# Patient Record
Sex: FEMALE | Race: WHITE | NOT HISPANIC OR LATINO | Employment: FULL TIME | URBAN - METROPOLITAN AREA
[De-identification: names, ages, dates, MRNs, and addresses within clinical notes are randomized per-mention and may not be internally consistent; named-entity substitution may affect disease eponyms.]

---

## 2024-05-03 ENCOUNTER — TELEPHONE (OUTPATIENT)
Dept: URGENT CARE | Facility: CLINIC | Age: 54
End: 2024-05-03

## 2024-05-03 ENCOUNTER — OFFICE VISIT (OUTPATIENT)
Dept: URGENT CARE | Facility: CLINIC | Age: 54
End: 2024-05-03
Payer: COMMERCIAL

## 2024-05-03 ENCOUNTER — PATIENT MESSAGE (OUTPATIENT)
Dept: URGENT CARE | Facility: CLINIC | Age: 54
End: 2024-05-03
Payer: COMMERCIAL

## 2024-05-03 VITALS
RESPIRATION RATE: 19 BRPM | SYSTOLIC BLOOD PRESSURE: 137 MMHG | OXYGEN SATURATION: 97 % | HEIGHT: 61 IN | WEIGHT: 190 LBS | BODY MASS INDEX: 35.87 KG/M2 | HEART RATE: 110 BPM | DIASTOLIC BLOOD PRESSURE: 82 MMHG | TEMPERATURE: 100 F

## 2024-05-03 DIAGNOSIS — J18.9 PNEUMONIA OF RIGHT UPPER LOBE DUE TO INFECTIOUS ORGANISM: Primary | ICD-10-CM

## 2024-05-03 DIAGNOSIS — R06.2 WHEEZING: ICD-10-CM

## 2024-05-03 DIAGNOSIS — R50.9 FEVER, UNSPECIFIED FEVER CAUSE: ICD-10-CM

## 2024-05-03 DIAGNOSIS — R05.9 COUGH, UNSPECIFIED TYPE: ICD-10-CM

## 2024-05-03 LAB
CTP QC/QA: YES
CTP QC/QA: YES
POC MOLECULAR INFLUENZA A AGN: NEGATIVE
POC MOLECULAR INFLUENZA B AGN: NEGATIVE
SARS-COV-2 AG RESP QL IA.RAPID: NEGATIVE

## 2024-05-03 PROCEDURE — 94640 AIRWAY INHALATION TREATMENT: CPT | Mod: S$GLB,,, | Performed by: FAMILY MEDICINE

## 2024-05-03 PROCEDURE — 99203 OFFICE O/P NEW LOW 30 MIN: CPT | Mod: 25,S$GLB,, | Performed by: NURSE PRACTITIONER

## 2024-05-03 PROCEDURE — 71046 X-RAY EXAM CHEST 2 VIEWS: CPT | Mod: S$GLB,,, | Performed by: RADIOLOGY

## 2024-05-03 PROCEDURE — 87502 INFLUENZA DNA AMP PROBE: CPT | Mod: QW,S$GLB,, | Performed by: NURSE PRACTITIONER

## 2024-05-03 PROCEDURE — 87811 SARS-COV-2 COVID19 W/OPTIC: CPT | Mod: QW,S$GLB,, | Performed by: NURSE PRACTITIONER

## 2024-05-03 RX ORDER — BENZONATATE 200 MG/1
200 CAPSULE ORAL 3 TIMES DAILY PRN
Qty: 30 CAPSULE | Refills: 0 | Status: SHIPPED | OUTPATIENT
Start: 2024-05-03 | End: 2024-05-13

## 2024-05-03 RX ORDER — AZITHROMYCIN 250 MG/1
TABLET, FILM COATED ORAL
Qty: 6 TABLET | Refills: 0 | Status: SHIPPED | OUTPATIENT
Start: 2024-05-03

## 2024-05-03 RX ORDER — ALBUTEROL SULFATE 90 UG/1
2 AEROSOL, METERED RESPIRATORY (INHALATION) EVERY 6 HOURS PRN
Qty: 18 G | Refills: 0 | Status: SHIPPED | OUTPATIENT
Start: 2024-05-03 | End: 2025-05-03

## 2024-05-03 RX ORDER — ALBUTEROL SULFATE 0.83 MG/ML
2.5 SOLUTION RESPIRATORY (INHALATION)
Status: COMPLETED | OUTPATIENT
Start: 2024-05-03 | End: 2024-05-03

## 2024-05-03 RX ORDER — AMOXICILLIN AND CLAVULANATE POTASSIUM 875; 125 MG/1; MG/1
1 TABLET, FILM COATED ORAL 2 TIMES DAILY
Qty: 20 TABLET | Refills: 0 | Status: SHIPPED | OUTPATIENT
Start: 2024-05-03 | End: 2024-05-13

## 2024-05-03 RX ORDER — GABAPENTIN 300 MG/1
300 CAPSULE ORAL 3 TIMES DAILY
COMMUNITY
Start: 2024-04-19

## 2024-05-03 RX ORDER — ZOLPIDEM TARTRATE 10 MG/1
10 TABLET ORAL NIGHTLY PRN
COMMUNITY
Start: 2024-04-23

## 2024-05-03 RX ADMIN — ALBUTEROL SULFATE 2.5 MG: 0.83 SOLUTION RESPIRATORY (INHALATION) at 09:05

## 2024-05-03 NOTE — TELEPHONE ENCOUNTER
X-Ray Chest PA And Lateral    Result Date: 5/3/2024  EXAMINATION: XR CHEST PA AND LATERAL CLINICAL HISTORY: Cough, unspecified TECHNIQUE: PA and lateral views of the chest were performed. COMPARISON: None. FINDINGS: The heart size and the mediastinal contour are normal.  Lungs are expanded.  The posterior segment of the right upper lobe has a few cm region of consolidation.  With history of cough and fever this could represent pneumonia.  Aspiration and asymmetric edema are other possibilities.  Small amount of consolidation is present at right lung base laterally also.  Left lung looks clear.  No significant pleural fluid is seen.  Skeletal structures are intact.     Right upper lobe consolidation, possible pneumonia.  Clinical correlation and follow-up recommended. This report was flagged in Epic as abnormal. Electronically signed by: Jonathan Rangel MD Date:    05/03/2024 Time:    10:02      Left message for callback

## 2024-05-03 NOTE — LETTER
[address] [facility] [address]  Phone: [phone]  Fax: [phone]          Return to Work/School    Patient: Anna Suarez  YOB: 1970   Date: 05/03/2024     To Whom It May Concern:     Anna Suarez was in contact with/seen in my office on 05/03/2024. COVID-19 is present in our communities across the state. There is limited testing for COVID at this time, so not all patients can be tested. In this situation, your employee meets the following criteria:     Anna Suarez has met the criteria for COVID-19 testing and has a NEGATIVE result. The employee can return to work once they are asymptomatic for 24 hours without the use of fever reducing medications (Tylenol, Motrin, etc).     If you have any questions or concerns, or if I can be of further assistance, please do not hesitate to contact me.     Sincerely,    Brianna Cordoba NP

## 2024-05-03 NOTE — PROGRESS NOTES
"Subjective:      Patient ID: Anna Suarez is a 53 y.o. female.    Vitals:  height is 5' 1" (1.549 m) and weight is 86.2 kg (190 lb). Her oral temperature is 99.8 °F (37.7 °C). Her blood pressure is 137/82 and her pulse is 110. Her respiration is 19 and oxygen saturation is 97%.     Chief Complaint: Cough (A [facility] visit yesterday recommended that I come to an urgent care to check for bronchitis or maybe pneumonia.  They recommended oxygen levels be checked & maybe a chest xray.  Coughing, shortness of breath, and fever at times. - Entered by patient)    Patient presents with cough, rattling in chest, fever, head congestion, wheezing,shortness of breath. Onset 3 days. Otc meds  Provider note begins below    Patient reports sinus congestion started a week ago.  She started having headaches 6 days ago.  Fevers that are mostly at night started 2 days ago.  Her fevers have been over 101.  She has a cough with shortness of breath when walking.  No history of asthma or COPD.      Cough  This is a new problem. Episode onset: -3days. The problem has been unchanged. The problem occurs constantly. Associated symptoms include a fever, nasal congestion, postnasal drip, shortness of breath and wheezing. Pertinent negatives include no chest pain or sore throat. The symptoms are aggravated by pollens. She has tried OTC cough suppressant for the symptoms. The treatment provided no relief.       Constitution: Positive for fatigue and fever.   HENT:  Positive for postnasal drip. Negative for sore throat.    Cardiovascular:  Negative for chest pain.   Respiratory:  Positive for chest tightness, cough, shortness of breath and wheezing. Negative for asthma.    Allergic/Immunologic: Negative for asthma.      Objective:     Physical Exam   Constitutional: She is oriented to person, place, and time.   HENT:   Head: Normocephalic and atraumatic.   Ears:   Right Ear: Tympanic membrane, external ear and ear canal normal.   Left Ear: " Tympanic membrane, external ear and ear canal normal.   Nose: Rhinorrhea present. No congestion.   Mouth/Throat: No oropharyngeal exudate or posterior oropharyngeal erythema.   Cardiovascular: Regular rhythm. Tachycardia present.   Pulmonary/Chest: Effort normal and breath sounds normal. No stridor. No respiratory distress. She has no wheezes. She has no rhonchi. She has no rales. She exhibits no tenderness.   Abdominal: Normal appearance.   Neurological: She is alert and oriented to person, place, and time.   Skin: Skin is warm and dry.   Psychiatric: Her behavior is normal. Mood normal.         Results for orders placed or performed in visit on 05/03/24   SARS Coronavirus 2 Antigen, POCT Manual Read   Result Value Ref Range    SARS Coronavirus 2 Antigen Negative Negative     Acceptable Yes    POCT Influenza A/B MOLECULAR   Result Value Ref Range    POC Molecular Influenza A Ag Negative Negative    POC Molecular Influenza B Ag Negative Negative     Acceptable Yes       Assessment:     1. Pneumonia of right upper lobe due to infectious organism    2. Cough, unspecified type    3. Fever, unspecified fever cause    4. Wheezing        Plan:   COVID test negative   Flu test negative  Chest x-ray possible right upper lobe pneumonia  Nebulizer treatment in clinic  Inhaler for home use   Tessalon Perles and antibiotics   Follow-up if not improving        Pneumonia of right upper lobe due to infectious organism    Cough, unspecified type  -     SARS Coronavirus 2 Antigen, POCT Manual Read  -     POCT Influenza A/B MOLECULAR  -     X-Ray Chest PA And Lateral; Future; Expected date: 05/03/2024    Fever, unspecified fever cause  -     POCT Influenza A/B MOLECULAR  -     X-Ray Chest PA And Lateral; Future; Expected date: 05/03/2024    Wheezing  -     albuterol (VENTOLIN HFA) 90 mcg/actuation inhaler; Inhale 2 puffs into the lungs every 6 (six) hours as needed for Wheezing. Rescue  Dispense: 18 g;  Refill: 0  -     albuterol nebulizer solution 2.5 mg    Other orders  -     amoxicillin-clavulanate 875-125mg (AUGMENTIN) 875-125 mg per tablet; Take 1 tablet by mouth 2 (two) times daily. for 10 days  Dispense: 20 tablet; Refill: 0  -     benzonatate (TESSALON) 200 MG capsule; Take 1 capsule (200 mg total) by mouth 3 (three) times daily as needed for Cough.  Dispense: 30 capsule; Refill: 0

## 2024-05-04 ENCOUNTER — TELEPHONE (OUTPATIENT)
Dept: URGENT CARE | Facility: CLINIC | Age: 54
End: 2024-05-04
Payer: COMMERCIAL

## 2024-05-04 NOTE — TELEPHONE ENCOUNTER
Follow-up call. Still has fever.  Has been on antibiotics for 24 hours.  Will call tomorrow to check on patient.

## 2024-05-05 ENCOUNTER — TELEPHONE (OUTPATIENT)
Dept: URGENT CARE | Facility: CLINIC | Age: 54
End: 2024-05-05
Payer: COMMERCIAL

## 2024-05-05 DIAGNOSIS — J18.9 PNEUMONIA OF RIGHT UPPER LOBE DUE TO INFECTIOUS ORGANISM: Primary | ICD-10-CM

## 2024-05-05 RX ORDER — PROMETHAZINE HYDROCHLORIDE AND DEXTROMETHORPHAN HYDROBROMIDE 6.25; 15 MG/5ML; MG/5ML
5 SYRUP ORAL NIGHTLY PRN
Qty: 120 ML | Refills: 0 | Status: SHIPPED | OUTPATIENT
Start: 2024-05-05 | End: 2024-05-15

## 2024-05-05 NOTE — TELEPHONE ENCOUNTER
Follow-up call.  Still having a fever. Decreasing fever.  Having diarrhea.  Drink plenty of electrolytes and fluids.  Avoid diary products, spicy foods, and greasy foods.  Hettinger diet.  Go to ER if not urinating or noted blood in stool.     Hettinger diet.   Follow up if not improving.

## 2024-07-01 ENCOUNTER — OFFICE VISIT (OUTPATIENT)
Dept: PAIN MEDICINE | Facility: CLINIC | Age: 54
End: 2024-07-01
Payer: COMMERCIAL

## 2024-07-01 VITALS
HEART RATE: 83 BPM | HEIGHT: 61 IN | WEIGHT: 191.81 LBS | OXYGEN SATURATION: 100 % | RESPIRATION RATE: 18 BRPM | SYSTOLIC BLOOD PRESSURE: 164 MMHG | TEMPERATURE: 98 F | DIASTOLIC BLOOD PRESSURE: 98 MMHG | BODY MASS INDEX: 36.21 KG/M2

## 2024-07-01 DIAGNOSIS — M54.12 CERVICAL RADICULOPATHY: ICD-10-CM

## 2024-07-01 DIAGNOSIS — M48.02 CERVICAL SPINAL STENOSIS: Primary | ICD-10-CM

## 2024-07-01 PROCEDURE — 3080F DIAST BP >= 90 MM HG: CPT | Mod: CPTII,S$GLB,, | Performed by: ANESTHESIOLOGY

## 2024-07-01 PROCEDURE — 3008F BODY MASS INDEX DOCD: CPT | Mod: CPTII,S$GLB,, | Performed by: ANESTHESIOLOGY

## 2024-07-01 PROCEDURE — 99204 OFFICE O/P NEW MOD 45 MIN: CPT | Mod: S$GLB,,, | Performed by: ANESTHESIOLOGY

## 2024-07-01 PROCEDURE — 99999 PR PBB SHADOW E&M-EST. PATIENT-LVL V: CPT | Mod: PBBFAC,,, | Performed by: ANESTHESIOLOGY

## 2024-07-01 PROCEDURE — 1159F MED LIST DOCD IN RCRD: CPT | Mod: CPTII,S$GLB,, | Performed by: ANESTHESIOLOGY

## 2024-07-01 PROCEDURE — 1160F RVW MEDS BY RX/DR IN RCRD: CPT | Mod: CPTII,S$GLB,, | Performed by: ANESTHESIOLOGY

## 2024-07-01 PROCEDURE — 3077F SYST BP >= 140 MM HG: CPT | Mod: CPTII,S$GLB,, | Performed by: ANESTHESIOLOGY

## 2024-07-01 NOTE — PROGRESS NOTES
Chronic Pain - New Consult    Referring Physician: Domenico, Lucretiareferral    Chief Complaint:   Chief Complaint   Patient presents with    Neck Pain    Low-back Pain     SUBJECTIVE:    Anna Suarez presents to the clinic for the evaluation right arm pain. The pain started 2 years ago following a diagnosis of spinal cord stenosis in 2022 and symptoms have been unchanged.The pain is located in the right arm and describes it as burning and numbness with some radiation of shooting pain up the right arm and is rated as 3/10. The pain is rated with a score of  3/10 on the BEST day and a score of 10/10 on the WORST day.  Symptoms interfere with daily activity, sleeping, and work. The pain is exacerbated by laying for too long.  The pain is mitigated by 300 mg Gabapentin TID, 10 mg Ambien and 50 mg Tramadol but non consistent with taking the medication. The patient reports 3 hours of uninterrupted sleep per night.        Physical Therapy/Home Exercise: yes - reported no alleviation in pain with PT.     Pain Disability Index Review:      7/1/2024     3:23 PM   Last 3 PDI Scores   Pain Disability Index (PDI) 21     Pain Medications:    Gabapentin   Tramadol      report:  Reviewed and consistent with medication use as prescribed.    Pain Procedures: C6 and C7 AVERY x 4. Last 02/05/2024 with reports of 80% pain alleviation until now.     Imaging: Outside MRI cervical spine shows cervical spinal stenosis.     History reviewed. No pertinent past medical history.  History reviewed. No pertinent surgical history.  Social History     Socioeconomic History    Marital status:    Tobacco Use    Smoking status: Never    Smokeless tobacco: Never   Substance and Sexual Activity    Alcohol use: Not Currently    Drug use: Not Currently     Current Outpatient Medications   Medication Sig    gabapentin (NEURONTIN) 300 MG capsule Take 300 mg by mouth 3 (three) times daily.    zolpidem (AMBIEN) 10 mg Tab Take 10 mg by mouth nightly as  "needed.    albuterol (VENTOLIN HFA) 90 mcg/actuation inhaler Inhale 2 puffs into the lungs every 6 (six) hours as needed for Wheezing. Rescue    azithromycin (ZITHROMAX Z-DEREK) 250 MG tablet Take 2 tablets (500 mg) on  Day 1,  followed by 1 tablet (250 mg) once daily on Days 2 through 5.     No current facility-administered medications for this visit.     REVIEW OF SYSTEMS:    GENERAL:  No weight loss, malaise or fevers.  HEENT:  Negative for frequent or significant headaches.  NECK:  Negative for lumps, goiter, pain and significant neck swelling.  RESPIRATORY:  Negative for cough, wheezing or shortness of breath.  CARDIOVASCULAR:  Negative for chest pain, leg swelling or palpitations.  GI:  Negative for abdominal discomfort, blood in stools or black stools or change in bowel habits.  MUSCULOSKELETAL:  See HPI.  SKIN:  Negative for lesions, rash, and itching.  PSYCH:  Negative for sleep disturbance, mood disorder and recent psychosocial stressors.  HEMATOLOGY/LYMPHOLOGY:  Negative for prolonged bleeding, bruising easily or swollen nodes.  NEURO:   No history of headaches, syncope, paralysis, seizures or tremors.  All other reviewed and negative other than HPI.    OBJECTIVE:    BP (!) 164/98   Pulse 83   Temp 98 °F (36.7 °C)   Resp 18   Ht 5' 1" (1.549 m)   Wt 87 kg (191 lb 12.8 oz)   SpO2 100%   BMI 36.24 kg/m²     PHYSICAL EXAMINATION:    General appearance: Well appearing, in no acute distress, alert and oriented x3.  Psych:  Mood and affect appropriate.  Skin: Skin color, texture, turgor normal, no rashes or lesions, in both upper and lower body.  Head/face:  Normocephalic, atraumatic. No palpable lymph nodes.  Neck: No pain to palpation over the cervical paraspinous muscles. Spurling Negative. No pain with neck flexion, extension, or lateral flexion.   Cor: RRR  Pulm: CTA  GI:  Soft and non-tender.  Back: Straight leg raising in the sitting and supine positions is negative to radicular pain. No pain to " palpation over the spine or costovertebral angles. Normal range of motion without pain reproduction.  Extremities: Peripheral joint ROM is full and pain free without obvious instability or laxity in all four extremities. No deformities, edema, or skin discoloration. Good capillary refill.  Musculoskeletal: Shoulder, hip, sacroiliac and knee provocative maneuvers are negative. Bilateral upper and lower extremity strength is normal and symmetric.  No atrophy or tone abnormalities are noted.  Neuro: Bilateral upper and lower extremity coordination and muscle stretch reflexes are physiologic and symmetric.  Plantar response are downgoing. No loss of sensation is noted.  Gait: normal.    ASSESSMENT: 53 y.o. year old female with right upper extremity pain, consistent with     1. Cervical spinal stenosis  Ambulatory referral/consult to Spine Surgery      2. Cervical radiculopathy  Ambulatory referral/consult to Spine Surgery        PLAN:     - Continue with current medication   - Reach out to pain medicine clinic if cervical AVERY is needed. RTC in 3 months.  - Instructed patient to forward MRI report to the clinic.   - Neurosurgery referral for evaluation of cervical spinal stenosis.   - Counseled patient regarding the importance of activity modification and physical therapy.    The above plan and management options were discussed at length with patient. Patient is in agreement with the above and verbalized understanding. It will be communicated with the referring physician via electronic record, fax, or mail.    Erick Ocampo  07/01/2024    I spent a total of 30 minutes on the day of the visit.  This includes face to face time and non-face to face time preparing to see the patient by reviewing previous labs/imaging, obtaining and/or reviewing separately obtained history, documenting clinical information in the electronic or other health record, independently interpreting results and communicating results to the  patient/family/caregiver.    Gerald Cardenas

## 2024-07-12 ENCOUNTER — TELEPHONE (OUTPATIENT)
Dept: PAIN MEDICINE | Facility: CLINIC | Age: 54
End: 2024-07-12
Payer: COMMERCIAL

## 2024-07-12 ENCOUNTER — TELEPHONE (OUTPATIENT)
Dept: SPINE | Facility: CLINIC | Age: 54
End: 2024-07-12
Payer: COMMERCIAL

## 2024-07-12 ENCOUNTER — PATIENT MESSAGE (OUTPATIENT)
Dept: PAIN MEDICINE | Facility: CLINIC | Age: 54
End: 2024-07-12
Payer: COMMERCIAL

## 2024-07-12 DIAGNOSIS — M48.02 CERVICAL SPINAL STENOSIS: Primary | ICD-10-CM

## 2024-07-12 DIAGNOSIS — M54.12 CERVICAL RADICULOPATHY: ICD-10-CM

## 2024-07-12 NOTE — TELEPHONE ENCOUNTER
----- Message from Ruthie Dasilva sent at 7/12/2024 10:59 AM CDT -----  Regarding: medical records  Name of caller: Anna       What is the requesting detail: pt is requesting a call back to find out the dates her medical records were sent to  [facility]. Please give her a call back to further discuss.       Can the clinic reply by [facility]:       What number to call back:[phone]

## 2024-07-12 NOTE — TELEPHONE ENCOUNTER
Staff contacted pt , regards of letting pt know that she has been scheduled incorrectly with the back and spine team because  doesn't do spine surgery . Pt would need to see someone who does spine surgery . Pt is aware and is upset . Staff tried to schedule pt with the Neurosurgery team.

## 2024-07-12 NOTE — TELEPHONE ENCOUNTER
----- Message from Shelly Balderrama sent at 7/12/2024 12:18 PM CDT -----  Type: Patient Call Back    Who called:pt     What is the request in detail:pt is calling to find out why her appt was cancelled for dos 7/18/24. Please call pt     Can the clinic reply by [facility]?    Would the patient rather a call back or a response via [facility]? call    Best call back number:There are no phone numbers on file.      Additional Information:

## 2024-07-15 ENCOUNTER — TELEPHONE (OUTPATIENT)
Dept: PAIN MEDICINE | Facility: CLINIC | Age: 54
End: 2024-07-15
Payer: COMMERCIAL

## 2024-07-15 RX ORDER — TRAMADOL HYDROCHLORIDE 50 MG/1
50 TABLET ORAL EVERY 12 HOURS PRN
Qty: 30 TABLET | Refills: 0 | Status: SHIPPED | OUTPATIENT
Start: 2024-07-15 | End: 2024-07-30

## 2024-07-26 ENCOUNTER — TELEPHONE (OUTPATIENT)
Dept: NEUROSURGERY | Facility: CLINIC | Age: 54
End: 2024-07-26
Payer: COMMERCIAL

## 2024-07-26 NOTE — TELEPHONE ENCOUNTER
Asked pt to please bring MRI disc from external facility to her appt on 7/30. Pt verbalized understsnding

## 2024-07-29 ENCOUNTER — TELEPHONE (OUTPATIENT)
Dept: NEUROSURGERY | Facility: CLINIC | Age: 54
End: 2024-07-29
Payer: COMMERCIAL

## 2024-07-30 ENCOUNTER — OFFICE VISIT (OUTPATIENT)
Dept: NEUROSURGERY | Facility: CLINIC | Age: 54
End: 2024-07-30
Payer: COMMERCIAL

## 2024-07-30 VITALS
BODY MASS INDEX: 35.8 KG/M2 | DIASTOLIC BLOOD PRESSURE: 84 MMHG | SYSTOLIC BLOOD PRESSURE: 147 MMHG | WEIGHT: 189.63 LBS | HEART RATE: 79 BPM | HEIGHT: 61 IN | OXYGEN SATURATION: 96 %

## 2024-07-30 DIAGNOSIS — M54.12 CERVICAL RADICULOPATHY: ICD-10-CM

## 2024-07-30 DIAGNOSIS — M48.02 CERVICAL SPINAL STENOSIS: ICD-10-CM

## 2024-07-30 DIAGNOSIS — M48.02 SPINAL STENOSIS, CERVICAL REGION: Primary | ICD-10-CM

## 2024-07-30 PROBLEM — G47.00 INSOMNIA: Status: ACTIVE | Noted: 2024-04-23

## 2024-07-30 PROCEDURE — 99204 OFFICE O/P NEW MOD 45 MIN: CPT | Mod: S$GLB,,, | Performed by: PHYSICIAN ASSISTANT

## 2024-07-30 PROCEDURE — 3077F SYST BP >= 140 MM HG: CPT | Mod: CPTII,S$GLB,, | Performed by: PHYSICIAN ASSISTANT

## 2024-07-30 PROCEDURE — 1159F MED LIST DOCD IN RCRD: CPT | Mod: CPTII,S$GLB,, | Performed by: PHYSICIAN ASSISTANT

## 2024-07-30 PROCEDURE — 3008F BODY MASS INDEX DOCD: CPT | Mod: CPTII,S$GLB,, | Performed by: PHYSICIAN ASSISTANT

## 2024-07-30 PROCEDURE — 3079F DIAST BP 80-89 MM HG: CPT | Mod: CPTII,S$GLB,, | Performed by: PHYSICIAN ASSISTANT

## 2024-07-30 PROCEDURE — 1160F RVW MEDS BY RX/DR IN RCRD: CPT | Mod: CPTII,S$GLB,, | Performed by: PHYSICIAN ASSISTANT

## 2024-07-30 RX ORDER — MELOXICAM 15 MG/1
TABLET ORAL
COMMUNITY
End: 2024-07-31

## 2024-07-30 RX ORDER — CYCLOBENZAPRINE HCL 10 MG
TABLET ORAL
COMMUNITY
End: 2024-07-31

## 2024-07-30 NOTE — PROGRESS NOTES
[facility]  Neurosurgery    SUBJECTIVE:     History of Present Illness:  Anna Suarez is a 53 y.o. female with chronic cervical radiculopathy who presents with cervical radiculopathy. Symptoms began at least two years ago and has responded well to interventional pain mgmt procedures in the past, though with decreasing efficacy. She denies neck pain. Pain starts in her right arm and travels down to her 1st 3 fingers, but occasionally includes all 5 fingers. She notes numbness in her right hand as well. Pain can be at any time of the day or night, but frequently disrupts sleep. She can stand up at night and let her right arm hang limp to get some relief.     Denies gait disturbance and fine motor dysfunction.     Treatments tried:  -AVERY: reports 4 prior procedures, three of which sound like cervical AVERY. Most recent is described as an injection with a lateral approach (facet injection, MBB?)  -Gabapentin: 300mg TID  -Muscle relaxer: flexeril 10  -Rx pain medications: tramadol and mobic    -Spine surgery: never    Blood thinners: none    (Not in a hospital admission)      Review of patient's allergies indicates:  No Known Allergies    Past Medical History:   Diagnosis Date    Anxiety     Depression     PTSD (post-traumatic stress disorder)      Past Surgical History:   Procedure Laterality Date    CARPAL TUNNEL RELEASE Right 2001    CARPAL TUNNEL RELEASE Right 2023    CERVICAL BIOPSY  W/ LOOP ELECTRODE EXCISION N/A     CHOLECYSTECTOMY N/A 2003    e sure implant Left 2012    REMOVAL OF INTRAUTERINE DEVICE (IUD) N/A 2019    UTERINE FIBROID EMBOLIZATION N/A     2008     Family History   Problem Relation Name Age of Onset    Diabetes Mellitus Mother      Breast cancer Mother      Hypertension Father      Alzheimer's disease Father      Diabetes type II Sister       Social History     Tobacco Use    Smoking status: Never    Smokeless tobacco: Never   Substance Use Topics    Alcohol use: Not Currently      "Alcohol/week: 4.0 standard drinks of alcohol     Types: 4 Shots of liquor per week    Drug use: Not Currently        Review of Systems:  As noted in HPI    OBJECTIVE:     Vital Signs (Most Recent):  Vitals:    07/30/24 0854 07/30/24 0952   BP: (!) 187/89 (!) 147/84   Pulse: 79    SpO2: 96%    Weight: 86 kg (189 lb 9.5 oz)    Height: 5' 1" (1.549 m)    PainSc:   4    PainLoc: Neck          Physical Exam:  General: well developed, well nourished, no distress  Head: normocephalic, atraumatic  Neurologic: Alert and oriented. Thought content appropriate  GCS: Motor: 6/Verbal: 5/Eyes: 4 GCS Total: 15  Language: No aphasia  Speech: No dysarthria  Cranial nerves: face symmetric, tongue midline, CN II-XII grossly intact.   Eyes: pupils equal, round, reactive to light with accommodation, EOMI.   Pulmonary: normal respirations, not labored, no accessory muscles used    Sensory: intact to light touch throughout; decreased in right C6 and C7 dermatomes     Motor Strength: Moves all extremities spontaneously with good tone.  Full strength upper and lower extremities. No abnormal movements seen.     Strength  Deltoids Triceps Biceps Wrist Extension Wrist Flexion Hand  FA   Upper: R 5/5 5/5 5/5 5/5 5/5 5/5 5/5    L 5/5 5/5 5/5 5/5 5/5 5/5 5/5     Iliopsoas         Lower: R 5/5          L 5/5           [place]: present on left   Clonus: absent    Gait: normal      Diagnostic Results:  I have personally reviewed imaging and agree with the findings.     MRI cervical spine, March 2024: (report only)  C2-3: Moderate left foraminal stenosis   C3-4:  Mild-to-moderate left > right foraminal stenosis with mild cord flattening  C4-5: Moderate right foraminal stenosis   C5-6: right foramen is minimally narrowed  C6-7: Severe left > right foraminal stenosis    ASSESSMENT/PLAN:     Anna Suarez is a 53 y.o. female who presents with right cervical radiculopathy. 2024 imaging could not be loaded today and has been sent to our MRI department " for review. 2022 imaging reviewed directly in clinic today (screen shots at the bottom of this note). 2022 imaging reveals significant right foraminal stenosis at C4-5, C5-6, and C6-7, with C6-7 being the most severe with associated moderate to severe central stenosis.     If her most recent MRI shows progression of the discs at C5-6 and C6-7, then I would recommend surgical decompression and fusion. I would like to check a cervical CT to rule out OPLL for final surgical planning. Xrays to assess stability also ordered. FU with Dr. Pandey once updated imaging is complete and MRI has been scanned into her chart.       Please feel free to call with any further questions        Araseli Benjamin PA-C  [facility]  Department of Neurosurgery  [phone]    Disclaimer: This note was dictated by speech recognition. Minor errors in transcription may be present.  Please call with any questions.    2022:

## 2024-07-30 NOTE — Clinical Note
Please have patient FU with Katherin after CT/xrays at [facility] and MRI scanned into chart.   Thanks,  Araseli

## 2024-07-31 ENCOUNTER — OFFICE VISIT (OUTPATIENT)
Dept: PAIN MEDICINE | Facility: CLINIC | Age: 54
End: 2024-07-31
Payer: COMMERCIAL

## 2024-07-31 VITALS
DIASTOLIC BLOOD PRESSURE: 83 MMHG | OXYGEN SATURATION: 99 % | TEMPERATURE: 97 F | HEART RATE: 73 BPM | SYSTOLIC BLOOD PRESSURE: 156 MMHG | WEIGHT: 192.25 LBS | BODY MASS INDEX: 36.32 KG/M2

## 2024-07-31 DIAGNOSIS — M48.02 CERVICAL SPINAL STENOSIS: ICD-10-CM

## 2024-07-31 DIAGNOSIS — M54.12 CERVICAL RADICULOPATHY: Primary | ICD-10-CM

## 2024-07-31 DIAGNOSIS — M62.838 MUSCLE SPASMS OF NECK: ICD-10-CM

## 2024-07-31 PROCEDURE — 3079F DIAST BP 80-89 MM HG: CPT | Mod: CPTII,S$GLB,,

## 2024-07-31 PROCEDURE — 1160F RVW MEDS BY RX/DR IN RCRD: CPT | Mod: CPTII,S$GLB,,

## 2024-07-31 PROCEDURE — 1159F MED LIST DOCD IN RCRD: CPT | Mod: CPTII,S$GLB,,

## 2024-07-31 PROCEDURE — 99999 PR PBB SHADOW E&M-EST. PATIENT-LVL III: CPT | Mod: PBBFAC,,,

## 2024-07-31 PROCEDURE — 99214 OFFICE O/P EST MOD 30 MIN: CPT | Mod: S$GLB,,,

## 2024-07-31 PROCEDURE — 3008F BODY MASS INDEX DOCD: CPT | Mod: CPTII,S$GLB,,

## 2024-07-31 PROCEDURE — 3077F SYST BP >= 140 MM HG: CPT | Mod: CPTII,S$GLB,,

## 2024-07-31 RX ORDER — DEXTROMETHORPHAN HYDROBROMIDE, BUPROPION HYDROCHLORIDE 105; 45 MG/1; MG/1
1 TABLET, MULTILAYER, EXTENDED RELEASE ORAL DAILY
COMMUNITY

## 2024-07-31 RX ORDER — TRAZODONE HYDROCHLORIDE 50 MG/1
50 TABLET ORAL NIGHTLY
COMMUNITY

## 2024-07-31 RX ORDER — CYCLOBENZAPRINE HCL 10 MG
10 TABLET ORAL
Qty: 30 TABLET | Refills: 2 | Status: SHIPPED | OUTPATIENT
Start: 2024-07-31

## 2024-07-31 NOTE — PATIENT INSTRUCTIONS
Consider increasing Gabapentin as tolerated. Can increase to 1200 mg three times daily as long as no side effects. Discuss with psychiatry.    Consider Duloxetine. Can help with neuropathic pain. Discuss with psychiatry.     Add Tylenol: Can take up to 1000 mg 2-3 times per day

## 2024-07-31 NOTE — PROGRESS NOTES
Chronic Pain - New Consult    Referring Physician: No ref. provider found    Chief Complaint:   Chief Complaint   Patient presents with    Hand Pain     SUBJECTIVE:    Interval History 7/31/2024:  Anna Suarez returns to clinic for follow-up of chronic neck pain. She saw Neurosurgery yesterday for initial consult. They are considering surgical decompression and fusion. The patient is currently scheduled for cervical imaging update of X-ray and CT. She recently had a short-course of Tramadol 50 mg q12 hours PRN by Dr Cardenas. She states the medication helped to take the edge off, but she is worried about long-term opioid use. She does not wish to continue and would like recommendations or other medications to help mitigate her pain. She is currently prescribed Gabapentin 300 mg TID by her psychiatrist. She denies recent falls or trauma. She denies new onset fever/night sweats, urinary incontinence, bowel incontinence, significant weight changes, significant motor weakness or changes, or loss of sensations. Her pain today is 4/10.       Original HPI 7/1/2024:  Anna Suarez presents to the clinic for the evaluation right arm pain. The pain started 2 years ago following a diagnosis of spinal cord stenosis in 2022 and symptoms have been unchanged.The pain is located in the right arm and describes it as burning and numbness with some radiation of shooting pain up the right arm and is rated as 3/10. The pain is rated with a score of  3/10 on the BEST day and a score of 10/10 on the WORST day.  Symptoms interfere with daily activity, sleeping, and work. The pain is exacerbated by laying for too long.  The pain is mitigated by 300 mg Gabapentin TID, 10 mg Ambien and 50 mg Tramadol but non consistent with taking the medication. The patient reports 3 hours of uninterrupted sleep per night.        Physical Therapy/Home Exercise: yes - reported no alleviation in pain with PT.     Pain Disability Index Review:      7/31/2024      8:12 AM 7/1/2024     3:23 PM   Last 3 PDI Scores   Pain Disability Index (PDI) 28 21     Pain Medications:    Gabapentin   Tramadol      report:  Reviewed and consistent with medication use as prescribed.    Pain Procedures: C6 and C7 AVERY x 4. Last 02/05/2024 with reports of 80% pain alleviation until now.     Imaging: Outside MRI cervical spine shows cervical spinal stenosis.     Past Medical History:   Diagnosis Date    Anxiety     Depression     PTSD (post-traumatic stress disorder)      Past Surgical History:   Procedure Laterality Date    CARPAL TUNNEL RELEASE Right 2001    CARPAL TUNNEL RELEASE Right 2023    CERVICAL BIOPSY  W/ LOOP ELECTRODE EXCISION N/A     CHOLECYSTECTOMY N/A 2003    e sure implant Left 2012    REMOVAL OF INTRAUTERINE DEVICE (IUD) N/A 2019    UTERINE FIBROID EMBOLIZATION N/A     2008     Social History     Socioeconomic History    Marital status:    Tobacco Use    Smoking status: Never    Smokeless tobacco: Never   Substance and Sexual Activity    Alcohol use: Not Currently     Alcohol/week: 4.0 standard drinks of alcohol     Types: 4 Shots of liquor per week    Drug use: Not Currently    Sexual activity: Not Currently     Social Determinants of Health     Financial Resource Strain: Patient Declined (7/25/2024)    Overall Financial Resource Strain (CARDIA)     Difficulty of Paying Living Expenses: Patient declined   Food Insecurity: Patient Declined (7/25/2024)    Hunger Vital Sign     Worried About Running Out of Food in the Last Year: Patient declined     Ran Out of Food in the Last Year: Patient declined   Physical Activity: Unknown (7/25/2024)    Exercise Vital Sign     Days of Exercise per Week: 2 days     Minutes of Exercise per Session: Patient declined   Stress: Patient Declined (7/25/2024)    [employer] [place] [facility] [employer] - Occupational Stress Questionnaire     Feeling of Stress : Patient declined   Housing Stability: Unknown (7/25/2024)    Housing Stability  Vital Sign     Unable to Pay for Housing in the Last Year: Patient declined     Current Outpatient Medications   Medication Sig    dextromethorphan-bupropion (AUVELITY)  mg TbIE Take 1 tablet by mouth once daily.    gabapentin (NEURONTIN) 300 MG capsule Take 300 mg by mouth 3 (three) times daily.    traZODone (DESYREL) 50 MG tablet Take 50 mg by mouth every evening.    zolpidem (AMBIEN) 10 mg Tab Take 10 mg by mouth nightly as needed.    cyclobenzaprine (FLEXERIL) 10 MG tablet Take 1 tablet (10 mg total) by mouth as needed for Muscle spasms (nightly).     No current facility-administered medications for this visit.     REVIEW OF SYSTEMS:    GENERAL:  No weight loss, malaise or fevers.  HEENT:  Negative for frequent or significant headaches.  NECK:  Negative for lumps, goiter, pain and significant neck swelling.  RESPIRATORY:  Negative for cough, wheezing or shortness of breath.  CARDIOVASCULAR:  Negative for chest pain, leg swelling or palpitations.  GI:  Negative for abdominal discomfort, blood in stools or black stools or change in bowel habits.  MUSCULOSKELETAL:  See HPI.  SKIN:  Negative for lesions, rash, and itching.  PSYCH:  Negative for sleep disturbance, mood disorder and recent psychosocial stressors.  HEMATOLOGY/LYMPHOLOGY:  Negative for prolonged bleeding, bruising easily or swollen nodes.  NEURO:   No history of headaches, syncope, paralysis, seizures or tremors.  All other reviewed and negative other than HPI.    OBJECTIVE:    BP (!) 156/83   Pulse 73   Temp 97.4 °F (36.3 °C)   Wt 87.2 kg (192 lb 3.9 oz)   SpO2 99%   BMI 36.32 kg/m²     PHYSICAL EXAMINATION:     General appearance: Well appearing, in no acute distress, alert and oriented x3.  Psych:  Mood and affect appropriate.  Skin: Skin color, texture, turgor normal, no rashes or lesions, in both upper and lower body.  Head/face:  Normocephalic, atraumatic. No palpable lymph nodes.  Neck: No pain to palpation over the cervical  paraspinous muscles. Spurling Negative. No pain with neck flexion, extension, or lateral flexion.   Cor: Rate regular.   Pulm: Bilateral chest rise. In no apparent respiratory distress.   GI:  Non-distended.   Extremities: Peripheral joint ROM is full and pain free without obvious instability or laxity in all four extremities. No deformities, edema, or skin discoloration. Good capillary refill.  Musculoskeletal: Bilateral upper and lower extremity strength is normal and symmetric.  No atrophy or tone abnormalities are noted.  Neuro: Norwood's absent. No loss of sensation is noted.  Gait: normal.    ASSESSMENT: 53 y.o. year old female with right upper extremity pain, consistent with     1. Cervical radiculopathy  cyclobenzaprine (FLEXERIL) 10 MG tablet      2. Muscle spasms of neck  cyclobenzaprine (FLEXERIL) 10 MG tablet      3. Cervical spinal stenosis            PLAN:     - Continue with current medication  - She defers opioid medication management at this time.  - Consider increasing Gabapentin dosing. Maximum dosing is 1200 mg TID.  Discuss with psychiatrist.   - Refilled Cyclobenzaprine 10 mg QHS.    - Consider adding Cymbalta 20-30 mg daily to start. Discuss with psychiatrist.  - Can add Tylenol 1000 mg 2-3 times per day.   - She is currently scheduled for C-spine X-ray and CT scan for surgical planning.   - Continue to follow-up with Neurosurgery re: cervical spinal stenosis.   - Counseled patient regarding the importance of activity modification and physical therapy.  - Consider Cervical AVERY. Patient will think about this.  - RTC for follow-up on 10/1/2024    The above plan and management options were discussed at length with patient. Patient is in agreement with the above and verbalized understanding.      Marietta Portillo NP  07/31/2024      I spent a total of 33 minutes on the day of the visit.  This includes face to face time and non-face to face time preparing to see the patient (eg, review of tests),  obtaining and/or reviewing separately obtained history, documenting clinical information in the electronic or other health record, independently interpreting results and communicating results to the patient/family/caregiver, or care coordinator.

## 2024-08-07 ENCOUNTER — HOSPITAL ENCOUNTER (OUTPATIENT)
Dept: RADIOLOGY | Facility: HOSPITAL | Age: 54
Discharge: HOME | End: 2024-08-07
Attending: PHYSICIAN ASSISTANT
Payer: COMMERCIAL

## 2024-08-07 DIAGNOSIS — M48.02 SPINAL STENOSIS, CERVICAL REGION: ICD-10-CM

## 2024-08-07 PROCEDURE — 72050 X-RAY EXAM NECK SPINE 4/5VWS: CPT | Mod: TC

## 2024-08-07 PROCEDURE — 72050 X-RAY EXAM NECK SPINE 4/5VWS: CPT | Mod: 26,,, | Performed by: RADIOLOGY

## 2024-08-07 PROCEDURE — 72125 CT NECK SPINE W/O DYE: CPT | Mod: TC

## 2024-08-07 PROCEDURE — 72125 CT NECK SPINE W/O DYE: CPT | Mod: 26,,, | Performed by: RADIOLOGY

## 2024-08-08 ENCOUNTER — PATIENT MESSAGE (OUTPATIENT)
Dept: NEUROSURGERY | Facility: CLINIC | Age: 54
End: 2024-08-08
Payer: COMMERCIAL

## 2024-08-08 DIAGNOSIS — R93.89 ABNORMAL CHEST X-RAY: Primary | ICD-10-CM

## 2024-08-15 ENCOUNTER — HOSPITAL ENCOUNTER (OUTPATIENT)
Dept: RADIOLOGY | Facility: HOSPITAL | Age: 54
Discharge: HOME | End: 2024-08-15
Attending: PHYSICIAN ASSISTANT
Payer: COMMERCIAL

## 2024-08-15 DIAGNOSIS — R93.89 ABNORMAL CHEST X-RAY: ICD-10-CM

## 2024-08-15 PROCEDURE — 71250 CT THORAX DX C-: CPT | Mod: TC

## 2024-08-15 PROCEDURE — 71250 CT THORAX DX C-: CPT | Mod: 26,,, | Performed by: RADIOLOGY

## 2024-08-29 ENCOUNTER — OFFICE VISIT (OUTPATIENT)
Dept: NEUROSURGERY | Facility: CLINIC | Age: 54
End: 2024-08-29
Attending: STUDENT IN AN ORGANIZED HEALTH CARE EDUCATION/TRAINING PROGRAM
Payer: COMMERCIAL

## 2024-08-29 VITALS
DIASTOLIC BLOOD PRESSURE: 71 MMHG | RESPIRATION RATE: 20 BRPM | HEART RATE: 91 BPM | SYSTOLIC BLOOD PRESSURE: 132 MMHG | HEIGHT: 61 IN | OXYGEN SATURATION: 98 % | TEMPERATURE: 99 F | BODY MASS INDEX: 35.01 KG/M2 | WEIGHT: 185.44 LBS

## 2024-08-29 DIAGNOSIS — M54.12 CERVICAL RADICULOPATHY: Primary | ICD-10-CM

## 2024-08-29 PROCEDURE — 3078F DIAST BP <80 MM HG: CPT | Mod: CPTII,S$GLB,, | Performed by: STUDENT IN AN ORGANIZED HEALTH CARE EDUCATION/TRAINING PROGRAM

## 2024-08-29 PROCEDURE — 3008F BODY MASS INDEX DOCD: CPT | Mod: CPTII,S$GLB,, | Performed by: STUDENT IN AN ORGANIZED HEALTH CARE EDUCATION/TRAINING PROGRAM

## 2024-08-29 PROCEDURE — 1159F MED LIST DOCD IN RCRD: CPT | Mod: CPTII,S$GLB,, | Performed by: STUDENT IN AN ORGANIZED HEALTH CARE EDUCATION/TRAINING PROGRAM

## 2024-08-29 PROCEDURE — 99214 OFFICE O/P EST MOD 30 MIN: CPT | Mod: S$GLB,,, | Performed by: STUDENT IN AN ORGANIZED HEALTH CARE EDUCATION/TRAINING PROGRAM

## 2024-08-29 PROCEDURE — 3075F SYST BP GE 130 - 139MM HG: CPT | Mod: CPTII,S$GLB,, | Performed by: STUDENT IN AN ORGANIZED HEALTH CARE EDUCATION/TRAINING PROGRAM

## 2024-08-29 NOTE — PROGRESS NOTES
[facility]  Neurosurgery    SUBJECTIVE:     History of Present Illness:  Anna Suarez is a 53 y.o. female with chronic cervical radiculopathy who presents with cervical radiculopathy. Symptoms began at least two years ago and has responded well to interventional pain mgmt procedures in the past, though with decreasing efficacy. She denies neck pain. Pain starts in her right arm and travels down to her 1st 3 fingers, but occasionally includes all 5 fingers. She notes numbness in her right hand as well. Pain can be at any time of the day or night, but frequently disrupts sleep. She can stand up at night and let her right arm hang limp to get some relief.     Denies gait disturbance and fine motor dysfunction.     Interval History 8/29/24: patient returns after imaging to discuss findings and consider surgery. She continues to have the same symptoms but now has noticed that she gets an occasional pain in her palmar aspect of right hand. Her symptoms are mostly numbness of fingers 1-3 on her right hand. A few times in the day it worsens and feels like burning nerve pain to her. This tends to be worse at night and interrupts her sleep. She is on a combination of medicines to help her sleep at night. She is able to work but it bothers her at work quite a bit. Her sleep is her biggest problem due to the pain which is affecting all areas of her life. She has tried multiple injections and they are becoming less effective. PT did not help her. Of note she has had carpal tunnel surgeries on both sides, the one on the right done back in 2001. She reports she had an EMG back in 2022 in [place] that supposedly said she has some return of carpal tunnel on the right.     Treatments tried:  -AVERY: reports 4 prior procedures, three of which sound like cervical AVERY. Most recent is described as an injection with a lateral approach (facet injection, MBB?)  -Gabapentin: 300mg TID  -Muscle relaxer: flexeril 10  -Rx pain  "medications: tramadol and mobic    -Spine surgery: never    Blood thinners: none    (Not in a hospital admission)      Review of patient's allergies indicates:  No Known Allergies    Past Medical History:   Diagnosis Date    Anxiety     Depression     PTSD (post-traumatic stress disorder)      Past Surgical History:   Procedure Laterality Date    CARPAL TUNNEL RELEASE Right 2001    CARPAL TUNNEL RELEASE Right 2023    CERVICAL BIOPSY  W/ LOOP ELECTRODE EXCISION N/A     CHOLECYSTECTOMY N/A 2003    e sure implant Left 2012    REMOVAL OF INTRAUTERINE DEVICE (IUD) N/A 2019    UTERINE FIBROID EMBOLIZATION N/A     2008     Family History   Problem Relation Name Age of Onset    Diabetes Mellitus Mother      Breast cancer Mother      Hypertension Father      Alzheimer's disease Father      Diabetes type II Sister       Social History     Tobacco Use    Smoking status: Never    Smokeless tobacco: Never   Substance Use Topics    Alcohol use: Not Currently     Alcohol/week: 4.0 standard drinks of alcohol     Types: 4 Shots of liquor per week    Drug use: Not Currently        Review of Systems:  As noted in HPI    OBJECTIVE:     Vital Signs (Most Recent):  Vitals:    08/29/24 0846   BP: 132/71   Pulse: 91   Resp: 20   Temp: 99.1 °F (37.3 °C)   TempSrc: Oral   SpO2: 98%   Weight: 84.1 kg (185 lb 6.5 oz)   Height: 5' 1" (1.549 m)   PainSc:   4   PainLoc: Hand         Physical Exam:  General: well developed, well nourished, no distress  Head: normocephalic, atraumatic  Neurologic: Alert and oriented. Thought content appropriate  GCS: Motor: 6/Verbal: 5/Eyes: 4 GCS Total: 15  Language: No aphasia  Speech: No dysarthria  Cranial nerves: face symmetric, tongue midline, CN II-XII grossly intact.   Eyes: pupils equal, round, reactive to light with accommodation, EOMI.   Pulmonary: normal respirations, not labored, no accessory muscles used    Sensory: intact to light touch throughout; decreased sensation in 2nd and 3rd finger on the " right.   Positive darlene and tinnel sign    Motor Strength: Moves all extremities spontaneously with good tone.  Full strength upper and lower extremities. No abnormal movements seen.     Strength  Deltoids Triceps Biceps Wrist Extension Wrist Flexion Hand  FA   Upper: R 5/5 5/5 5/5 5/5 5/5 5/5 5/5    L 5/5 5/5 5/5 5/5 5/5 5/5 5/5     Iliopsoas         Lower: R 5/5          L 5/5           Norwood: present on left   Clonus: absent    Gait: normal      Diagnostic Results:  I have personally reviewed imaging and agree with the findings.     MRI cervical spine, March 2024:  C2-3: Moderate left foraminal stenosis   C3-4:  Mild-to-moderate left > right foraminal stenosis   C4-5: Moderate right foraminal stenosis   C5-6: right foramen is minimally narrowed  C6-7: Severe left > right foraminal stenosis      CT C spine 8/7/24 showed mild calcification of disc at C6/C7 with bony osteophytes posteriorly. Some loss of cervical lordosis higher up in neck.  She has right-sided uncovertebral hypertrophy which is most pronounced at C4-5 and C6-7    Xray flex ex C spine 8/7/24: no dynamic instability    ASSESSMENT/PLAN:     Anna Suarez is a 53 y.o. female who presents with right cervical radiculopathy; I believe she is most symptomatic from disc degeneration with foraminal stenosis at C6-7  - continue current medication regiment for pain  - her pain is consistent with cervical radiculopathy, especially given the improvement she had with injections in the past. She may have a component of carpal tunnel that is contributing as well though.  We discussed at some point she could need revision carpal tunnel release in the right  - patient is wanting to proceed with surgery, but is not quite ready to schedule yet  - we did discuss the risks benefits indications alternatives of C6-7 ACDF  - patient is going to be in touch when she is ready to put something on the schedule    Ben Pandey  Neurosurgery

## 2024-08-30 ENCOUNTER — PATIENT MESSAGE (OUTPATIENT)
Dept: NEUROSURGERY | Facility: CLINIC | Age: 54
End: 2024-08-30
Payer: COMMERCIAL

## 2024-09-27 ENCOUNTER — OFFICE VISIT (OUTPATIENT)
Dept: URGENT CARE | Facility: CLINIC | Age: 54
End: 2024-09-27
Payer: COMMERCIAL

## 2024-09-27 VITALS
OXYGEN SATURATION: 99 % | BODY MASS INDEX: 34.93 KG/M2 | DIASTOLIC BLOOD PRESSURE: 93 MMHG | SYSTOLIC BLOOD PRESSURE: 155 MMHG | RESPIRATION RATE: 20 BRPM | HEART RATE: 92 BPM | WEIGHT: 185 LBS | HEIGHT: 61 IN | TEMPERATURE: 98 F

## 2024-09-27 DIAGNOSIS — M54.6 ACUTE LEFT-SIDED THORACIC BACK PAIN: ICD-10-CM

## 2024-09-27 DIAGNOSIS — S29.9XXA RIB INJURY: Primary | ICD-10-CM

## 2024-09-27 DIAGNOSIS — S20.222A: ICD-10-CM

## 2024-09-27 DIAGNOSIS — R07.81 RIB PAIN ON LEFT SIDE: ICD-10-CM

## 2024-09-27 PROBLEM — M47.812 CERVICAL SPONDYLOSIS WITHOUT MYELOPATHY: Status: ACTIVE | Noted: 2024-01-21

## 2024-09-27 PROCEDURE — 71100 X-RAY EXAM RIBS UNI 2 VIEWS: CPT | Mod: LT,S$GLB,, | Performed by: RADIOLOGY

## 2024-09-27 RX ORDER — AMOXICILLIN AND CLAVULANATE POTASSIUM 875; 125 MG/1; MG/1
TABLET, FILM COATED ORAL
COMMUNITY
End: 2024-09-27 | Stop reason: ALTCHOICE

## 2024-09-27 RX ORDER — TRAMADOL HYDROCHLORIDE 50 MG/1
TABLET ORAL
COMMUNITY

## 2024-09-27 RX ORDER — PROMETHAZINE HYDROCHLORIDE AND DEXTROMETHORPHAN HYDROBROMIDE 6.25; 15 MG/5ML; MG/5ML
SYRUP ORAL
COMMUNITY
End: 2024-09-27 | Stop reason: ALTCHOICE

## 2024-09-27 RX ORDER — BENZONATATE 200 MG/1
CAPSULE ORAL
COMMUNITY
End: 2024-09-27 | Stop reason: ALTCHOICE

## 2024-09-27 NOTE — PROGRESS NOTES
"Subjective:      Patient ID: Anna Suarez is a 54 y.o. female.    Vitals:  height is 5' 1" (1.549 m) and weight is 83.9 kg (185 lb). Her temperature is 98.4 °F (36.9 °C). Her blood pressure is 155/93 (abnormal) and her pulse is 92. Her respiration is 20 and oxygen saturation is 99%.     Chief Complaint: Injury (Fell & hit on back left side side bruising on ribs. Pain when coughed but was improving. Coughed around 2am 9/27, felt a pop, and pain increased when cough. Not sure if bruised rib or something like that. - Entered by patient)    Pt presents with complaint of left sided rib pain.  Pt states she tripped on some stairs and states she fell on her left side about 1 week ago.  Pt states her pain had bene improving until this morning when she coughed and states she heard a pop.  Pt states she started to experience the same pain form her incident.  Pt states no pain when deep breathing and denies SOB.    54-year-old female presents to clinic with reports of coughing around 2 am today and felt a popping sensation to her left ribs. Reports fall approximately one week ago injuring the same left side. History of cervical radiculopathy, right carpel tunnel release treated by neurosurgery      Injury  This is a new problem. The current episode started in the past 7 days. The problem occurs intermittently. The problem has been gradually worsening. Associated symptoms include myalgias. The symptoms are aggravated by twisting, bending and coughing. She has tried nothing for the symptoms. The treatment provided no relief.       Musculoskeletal:  Positive for pain, trauma, back pain and muscle ache.   Skin:  Positive for bruising.      Objective:     Physical Exam   Constitutional: She is oriented to person, place, and time. She appears well-developed. She is cooperative. No distress.   HENT:   Head: Normocephalic and atraumatic.   Nose: Nose normal.   Mouth/Throat: Oropharynx is clear and moist and mucous membranes are " normal.   Eyes: Lids are normal. Extraocular movement intact   Neck: Trachea normal and phonation normal. Neck supple.   Cardiovascular: Normal rate.   Pulmonary/Chest: Effort normal.   Abdominal: Normal appearance. She exhibits no mass.   Musculoskeletal:         General: No deformity.      Thoracic back: She exhibits tenderness. She exhibits no spasm.        Back:    Neurological: She is alert and oriented to person, place, and time. She has normal strength and normal reflexes. No sensory deficit.   Skin: Skin is warm, dry, intact and not diaphoretic.   Psychiatric: Her speech is normal and behavior is normal. Judgment and thought content normal.   Nursing note and vitals reviewed.      Assessment:     1. Rib injury    2. Rib pain on left side    3. Acute left-sided thoracic back pain    4. Superficial bruising of back, left, initial encounter        Plan:       Rib injury  -     X-Ray Ribs 2 View Left; Future; Expected date: 09/27/2024    Rib pain on left side  -     X-Ray Ribs 2 View Left; Future; Expected date: 09/27/2024  -     BANDAGE ELASTIC 6IN ACE    Acute left-sided thoracic back pain  -     X-Ray Ribs 2 View Left; Future; Expected date: 09/27/2024  -     BANDAGE ELASTIC 6IN ACE    Superficial bruising of back, left, initial encounter        X-Ray Ribs 2 View Left    Result Date: 9/27/2024  EXAMINATION: XR RIBS 2 VIEW LEFT CLINICAL HISTORY: Unspecified injury of thorax, initial encounter TECHNIQUE: Two views of the left ribs were performed. COMPARISON: None. FINDINGS: Heart size normal.  The lungs are clear.  No pleural effusion. No definite acute rib fractures identified     See above Electronically signed by: José Contreras MD Date:    09/27/2024 Time:    10:39      Reviewed xray with patient who acknowledged results. Discussed  Diagnosis and treatment plan with patient who verbalized understanding and agrees with plan of care. Patient given educational handouts supporting this diagnosis as well.  Ace  wrap applied, patient tolerated well. Less chest discomfort after application with deep inspiratory effort, movement and cough. Patient denies any further questions or concerns at this time.  Patient exits exam room in no acute distress.   Patient Instructions   Take 10 to 15 slow deep breaths at least 4 times each day. This will lower your chances of getting a lung infection. Use your incentive spirometer as you were told if you were given one. An incentive spirometer is a tool that measures how deeply you can breathe in.  Hold a pillow to your chest when you take deep breaths, sneeze, cough, or laugh. This will help ease the pain in your ribs.  It may hurt less to sleep in a reclined position. You can also sleep with your head and shoulders propped up on pillows.  You may want to take medicines like ibuprofen or naproxen for swelling and pain. These are nonsteroidal anti-inflammatory drugs (NSAIDS).  Ice may help you ease pain and swelling.  Place an ice pack or a bag of frozen vegetables wrapped in a towel over the painful part. Never put ice right on the skin. Do not leave the ice on more than 10 to 15 minutes at a time. Use for the first 24 to 48 hours after an injury.  Please return here or go to the Emergency Department for any concerns or worsening of condition.  Please follow up with your primary care doctor or specialist as needed.    If you  smoke, please stop smoking.

## 2024-09-27 NOTE — PATIENT INSTRUCTIONS
Take 10 to 15 slow deep breaths at least 4 times each day. This will lower your chances of getting a lung infection. Use your incentive spirometer as you were told if you were given one. An incentive spirometer is a tool that measures how deeply you can breathe in.  Hold a pillow to your chest when you take deep breaths, sneeze, cough, or laugh. This will help ease the pain in your ribs.  It may hurt less to sleep in a reclined position. You can also sleep with your head and shoulders propped up on pillows.  You may want to take medicines like ibuprofen or naproxen for swelling and pain. These are nonsteroidal anti-inflammatory drugs (NSAIDS).  Ice may help you ease pain and swelling.  Place an ice pack or a bag of frozen vegetables wrapped in a towel over the painful part. Never put ice right on the skin. Do not leave the ice on more than 10 to 15 minutes at a time. Use for the first 24 to 48 hours after an injury.  Please return here or go to the Emergency Department for any concerns or worsening of condition.  Please follow up with your primary care doctor or specialist as needed.    If you  smoke, please stop smoking.

## 2024-10-10 ENCOUNTER — PATIENT MESSAGE (OUTPATIENT)
Dept: NEUROSURGERY | Facility: CLINIC | Age: 54
End: 2024-10-10
Payer: COMMERCIAL

## 2024-10-14 DIAGNOSIS — M54.12 CERVICAL RADICULOPATHY: Primary | ICD-10-CM

## 2024-10-21 ENCOUNTER — ANESTHESIA EVENT (OUTPATIENT)
Dept: SURGERY | Facility: HOSPITAL | Age: 54
End: 2024-10-21
Payer: COMMERCIAL

## 2024-10-24 ENCOUNTER — LAB VISIT (OUTPATIENT)
Dept: LAB | Facility: HOSPITAL | Age: 54
End: 2024-10-24
Attending: HOSPITALIST
Payer: COMMERCIAL

## 2024-10-24 ENCOUNTER — OFFICE VISIT (OUTPATIENT)
Dept: INTERNAL MEDICINE | Facility: CLINIC | Age: 54
End: 2024-10-24
Payer: COMMERCIAL

## 2024-10-24 ENCOUNTER — TELEPHONE (OUTPATIENT)
Dept: PREADMISSION TESTING | Facility: HOSPITAL | Age: 54
End: 2024-10-24
Payer: COMMERCIAL

## 2024-10-24 VITALS
HEART RATE: 76 BPM | DIASTOLIC BLOOD PRESSURE: 63 MMHG | HEIGHT: 61 IN | SYSTOLIC BLOOD PRESSURE: 131 MMHG | WEIGHT: 184 LBS | TEMPERATURE: 98 F | OXYGEN SATURATION: 95 % | BODY MASS INDEX: 34.74 KG/M2 | RESPIRATION RATE: 16 BRPM

## 2024-10-24 DIAGNOSIS — Z98.890 PONV (POSTOPERATIVE NAUSEA AND VOMITING): ICD-10-CM

## 2024-10-24 DIAGNOSIS — M35.1 MCTD (MIXED CONNECTIVE TISSUE DISEASE): ICD-10-CM

## 2024-10-24 DIAGNOSIS — R06.83 SNORING: ICD-10-CM

## 2024-10-24 DIAGNOSIS — Z01.818 PREOP EXAMINATION: ICD-10-CM

## 2024-10-24 DIAGNOSIS — M54.12 CERVICAL RADICULOPATHY: ICD-10-CM

## 2024-10-24 DIAGNOSIS — Z80.3 FAMILY HISTORY OF BREAST CANCER: ICD-10-CM

## 2024-10-24 DIAGNOSIS — F43.10 PTSD (POST-TRAUMATIC STRESS DISORDER): ICD-10-CM

## 2024-10-24 DIAGNOSIS — K21.9 GASTROESOPHAGEAL REFLUX DISEASE, UNSPECIFIED WHETHER ESOPHAGITIS PRESENT: ICD-10-CM

## 2024-10-24 DIAGNOSIS — K59.00 CONSTIPATION, UNSPECIFIED CONSTIPATION TYPE: ICD-10-CM

## 2024-10-24 DIAGNOSIS — R11.2 PONV (POSTOPERATIVE NAUSEA AND VOMITING): ICD-10-CM

## 2024-10-24 DIAGNOSIS — Z86.718 HISTORY OF THROMBOSIS: ICD-10-CM

## 2024-10-24 DIAGNOSIS — G47.00 INSOMNIA, UNSPECIFIED TYPE: ICD-10-CM

## 2024-10-24 DIAGNOSIS — Z97.5 IUD (INTRAUTERINE DEVICE) IN PLACE: ICD-10-CM

## 2024-10-24 DIAGNOSIS — Z01.818 PREOP EXAMINATION: Primary | ICD-10-CM

## 2024-10-24 DIAGNOSIS — Z87.01 HISTORY OF PNEUMONIA: ICD-10-CM

## 2024-10-24 LAB
ALBUMIN SERPL BCP-MCNC: 3.9 G/DL (ref 3.5–5.2)
ALP SERPL-CCNC: 62 U/L (ref 40–150)
ALT SERPL W/O P-5'-P-CCNC: 12 U/L (ref 10–44)
ANION GAP SERPL CALC-SCNC: 8 MMOL/L (ref 8–16)
AST SERPL-CCNC: 17 U/L (ref 10–40)
BASOPHILS # BLD AUTO: 0.02 K/UL (ref 0–0.2)
BASOPHILS NFR BLD: 0.3 % (ref 0–1.9)
BILIRUB SERPL-MCNC: 0.3 MG/DL (ref 0.1–1)
BUN SERPL-MCNC: 19 MG/DL (ref 6–20)
CALCIUM SERPL-MCNC: 9.3 MG/DL (ref 8.7–10.5)
CHLORIDE SERPL-SCNC: 104 MMOL/L (ref 95–110)
CO2 SERPL-SCNC: 27 MMOL/L (ref 23–29)
CREAT SERPL-MCNC: 0.8 MG/DL (ref 0.5–1.4)
DIFFERENTIAL METHOD BLD: ABNORMAL
EOSINOPHIL # BLD AUTO: 0.2 K/UL (ref 0–0.5)
EOSINOPHIL NFR BLD: 2.8 % (ref 0–8)
ERYTHROCYTE [DISTWIDTH] IN BLOOD BY AUTOMATED COUNT: 13.8 % (ref 11.5–14.5)
EST. GFR  (NO RACE VARIABLE): >60 ML/MIN/1.73 M^2
ESTIMATED AVG GLUCOSE: 108 MG/DL (ref 68–131)
GLUCOSE SERPL-MCNC: 93 MG/DL (ref 70–110)
HBA1C MFR BLD: 5.4 % (ref 4–5.6)
HCT VFR BLD AUTO: 42.9 % (ref 37–48.5)
HGB BLD-MCNC: 13.5 G/DL (ref 12–16)
IMM GRANULOCYTES # BLD AUTO: 0.02 K/UL (ref 0–0.04)
IMM GRANULOCYTES NFR BLD AUTO: 0.3 % (ref 0–0.5)
INR PPP: 1 (ref 0.8–1.2)
LYMPHOCYTES # BLD AUTO: 2.1 K/UL (ref 1–4.8)
LYMPHOCYTES NFR BLD: 28 % (ref 18–48)
MCH RBC QN AUTO: 29.3 PG (ref 27–31)
MCHC RBC AUTO-ENTMCNC: 31.5 G/DL (ref 32–36)
MCV RBC AUTO: 93 FL (ref 82–98)
MONOCYTES # BLD AUTO: 0.7 K/UL (ref 0.3–1)
MONOCYTES NFR BLD: 9 % (ref 4–15)
NEUTROPHILS # BLD AUTO: 4.5 K/UL (ref 1.8–7.7)
NEUTROPHILS NFR BLD: 59.6 % (ref 38–73)
NRBC BLD-RTO: 0 /100 WBC
PLATELET # BLD AUTO: 261 K/UL (ref 150–450)
PMV BLD AUTO: 10.3 FL (ref 9.2–12.9)
POTASSIUM SERPL-SCNC: 4.2 MMOL/L (ref 3.5–5.1)
PROT SERPL-MCNC: 7.6 G/DL (ref 6–8.4)
PROTHROMBIN TIME: 10.9 SEC (ref 9–12.5)
RBC # BLD AUTO: 4.61 M/UL (ref 4–5.4)
SODIUM SERPL-SCNC: 139 MMOL/L (ref 136–145)
WBC # BLD AUTO: 7.46 K/UL (ref 3.9–12.7)

## 2024-10-24 PROCEDURE — 85025 COMPLETE CBC W/AUTO DIFF WBC: CPT | Performed by: HOSPITALIST

## 2024-10-24 PROCEDURE — 83036 HEMOGLOBIN GLYCOSYLATED A1C: CPT | Performed by: HOSPITALIST

## 2024-10-24 PROCEDURE — 3075F SYST BP GE 130 - 139MM HG: CPT | Mod: CPTII,S$GLB,, | Performed by: HOSPITALIST

## 2024-10-24 PROCEDURE — 99999 PR PBB SHADOW E&M-EST. PATIENT-LVL V: CPT | Mod: PBBFAC,,, | Performed by: HOSPITALIST

## 2024-10-24 PROCEDURE — 1160F RVW MEDS BY RX/DR IN RCRD: CPT | Mod: CPTII,S$GLB,, | Performed by: HOSPITALIST

## 2024-10-24 PROCEDURE — 80053 COMPREHEN METABOLIC PANEL: CPT | Performed by: HOSPITALIST

## 2024-10-24 PROCEDURE — 1159F MED LIST DOCD IN RCRD: CPT | Mod: CPTII,S$GLB,, | Performed by: HOSPITALIST

## 2024-10-24 PROCEDURE — 99205 OFFICE O/P NEW HI 60 MIN: CPT | Mod: S$GLB,,, | Performed by: HOSPITALIST

## 2024-10-24 PROCEDURE — 3078F DIAST BP <80 MM HG: CPT | Mod: CPTII,S$GLB,, | Performed by: HOSPITALIST

## 2024-10-24 PROCEDURE — 85610 PROTHROMBIN TIME: CPT | Performed by: HOSPITALIST

## 2024-10-24 PROCEDURE — 3008F BODY MASS INDEX DOCD: CPT | Mod: CPTII,S$GLB,, | Performed by: HOSPITALIST

## 2024-10-24 RX ORDER — CLONIDINE HYDROCHLORIDE 0.1 MG/1
0.1 TABLET ORAL NIGHTLY
COMMUNITY

## 2024-10-24 RX ORDER — ACETAMINOPHEN 325 MG/1
325 TABLET ORAL
COMMUNITY

## 2024-10-24 RX ORDER — CLONIDINE 0.1 MG/24H
1 PATCH, EXTENDED RELEASE TRANSDERMAL
COMMUNITY

## 2024-10-24 RX ORDER — IBUPROFEN 200 MG
800 TABLET ORAL
COMMUNITY

## 2024-10-24 NOTE — ASSESSMENT & PLAN NOTE
BMI 34.77    I suggest monitoring the sodium as SIADH from wellbutrin  use and hypersecretion of ADH associated with surgery can reduce sodium in the perioperative period     Weight related conditions     Known to have     HTN  Acid reflux       Not troubled with / Not known to have       Type 2 Diabetes   Prediabetes   Gout   Hyperlipidemia   Sleep apnea   Fatty liver   Osteoarthritis    Encouraged maintaining healthy weight for improved health

## 2024-10-24 NOTE — ASSESSMENT & PLAN NOTE
Her mother has had breast cancer at a later age   I have discussed the importance of mammograms and breast cancer screening with self-exam and clinical exam

## 2024-10-24 NOTE — ASSESSMENT & PLAN NOTE
Possible sleep apnea- I suggest a sleep study and suggest caution with usage of medication that can cause respiratory suppression in the perioperative period  potential ramifications of untreated sleep apnea, which could include daytime sleepiness, hypertension, heart disease and stroke were discussed  Suggested not to drive, if feels sleepy    Avoidance of  supine sleep, weight gain , alcoholic beverages , care with , sedative , CNS depressant use indicated  since all of these can worsen XUAN      I have offered her sleep evaluation that she took-she wants to do this when she is done with the surgery and healing process which perhaps could be next year

## 2024-10-24 NOTE — ASSESSMENT & PLAN NOTE
PTSD clonidine dextromethorphan-bupropion trazodone come Ambien 10 mouth nightly as needed    She believes that they were multiple episodes that may have contributed to her PTSD and she is working on desensitization    Doing good from a mental health stand point   Not under psychiatry , Therapist care   Has supportive friends   On Medication that is helping   No Suicidal / Homicidal ideation      I suggested for her to reach out to her psychiatrist about perioperative use of her mental health medication

## 2024-10-24 NOTE — ASSESSMENT & PLAN NOTE
Currently not an active problem and not known to have rheumatoid arthritis, lupus, antiphospholipid antibody syndrome

## 2024-10-24 NOTE — ASSESSMENT & PLAN NOTE
Post operative nausea, vomiting - I suggest that the perioperative team be aware of this so that appropriate preventive care can be taken      She finds scopolamine patch to be helpful

## 2024-10-24 NOTE — ASSESSMENT & PLAN NOTE
She is on Ambien nightly as needed for sleeping and she also takes medication to help her sleep so that she does not experience the pain at nighttime

## 2024-10-24 NOTE — ASSESSMENT & PLAN NOTE
Mirena IUD  She started using IUD as a contraceptive  She was last sexually active with a male partner in June of 2021   She is currently not sexually active with either male or female since that time  Not pregnant to her understanding  She finds the Mirena IUD to be helpful with her mental health also    Increased risk of thrombosis discussed

## 2024-10-24 NOTE — ASSESSMENT & PLAN NOTE
She had at that time cough, phlegm and high fever  Right upper lobe consolidation, possible pneumonia   She was given antibiotic courses x3 and currently does not have any suggestions of pneumonia and has no fever cough phlegm    CT scan from August of 2024 reportedly showed  Improvement of the previously seen consolidation area in the right upper lobe with residual linear/bandlike opacity in its posterior aspect.  Scattered bilateral pulmonary micro nodules measuring 2-3 mm.  For multiple solid nodules all <6 mm, Fleischner Society 2017 guidelines recommend no routine follow up for a low risk patient, or follow up with non-contrast chest CT at 12 months after discovery in a high risk patient.    She quit tobacco about 1 year ago in October 2023    Suggested follow up    When she did smoke, she was not a heavy smoker and has had no tuberculosis contacts to her knowledge    No tuberculosis symptoms like night sweats, appetite loss or weight loss

## 2024-10-24 NOTE — ASSESSMENT & PLAN NOTE
Does not sound Cardiac   Tips to control reflux discussed     GERD-  I suggest continuation of the Proton pump inhibitor in the perioperative period . I suggest aspiration precautions    I suggest watching fatty and spicy food  Citrus and tomato based  Alcohol, caffeine, soda  Chocolate, peppermint spearmint  Not to lay down within 3 hours after eating  Not to snack before going to bed  Sleep elevated  Watching weight  Watching anti-inflammatory medication    Contributing factors for Reflux     NSAID medication

## 2024-10-24 NOTE — HPI
History of present illness- I had the pleasure of meeting this pleasant 54 y.o. lady in the pre op clinic prior to her elective spine surgery. The patient is new to me .  Offered to have family to be on the phone during the consultation      I have obtained the history by speaking to the patient and by reviewing the electronic health records.    Events leading up to surgery / History of presenting illness -    I have obtained the information by speaking to her who seems to be knowledgeable about her health    She has been troubled with pain on the right upper extremity mostly in her right hand and right forearm and gradually getting to the right upper arm  She believes that due to the arthritic changes in her neck, the nerve has been affected leading to her symptoms and she is planning on having surgery done for this on November the 18th  She has had a carpal tunnel surgery on the right hand in 2001 and if her symptoms on her right hand does not resolve, she may consider getting the carpal tunnel surgery    She has been having this discomfort about 2 years time and her pain fluctuates and can get to up to 8-9/10    Her symptoms her mostly at nighttime when she is sleeping which seems to be affecting her ability to rest and sleep and sometimes she has 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[employer]    At nighttime she switches between Tylenol/ibuprofen as she does not like taking tramadol    She also finds standing up and letting her arm hang by her side to be helpful    With pain and numbness, on occasion she has difficulty with her right hand like opening jars-she has no problems like dropping things or balance problems    She also has numbness in the similar distribution in addition to pain    With regards to the age of her having this kind of problem, she is not aware of any significant arthritis problem in her family to her knowledge    Of told she was  diagnosed with mixed connective tissue disease in 2012-and not known to have rheumatoid arthritis, lupus, antiphospholipid antibody syndrome-she was under rheumatology care but to her understanding she was not have any active rheumatological conditions        Relevant health conditions of significance for the perioperative period/ History of presenting illness -    PTSD  BMI 34.77  History of pneumonia  History of thrombosis left leg-superficial vein  Acid reflux  Constipation     Lives by herself on 2nd level apartment complex  Works full-time and the [employer] department  Pets- none  Children - none-   Pregnancies - 1  Miscarriages -2004 - 8 weeks into the gestation   C sections - none  She has   Has help post op    She is from [place] but traveled places and settled in [place] and she has help coming from [place] for a week and local help after that    Not known to have  heart problem , Stroke/ Mini stroke ,Prediabetes , Diabetes Mellitus,  Thyroid problem, Kidney problem, deep vein thrombosis, pulmonary embolism,   sleep apnea, COVID infection, fatty liver , blood vessels stent ,  edema, gout, allergies

## 2024-10-24 NOTE — ASSESSMENT & PLAN NOTE
Longstanding constipation   She has not had a colonoscopies yet  She had no colon cancer in the family    Suggested working on bowel movements in preparation for surgery   Constipation- I suggest giving bowel movement regimen as opioid use,reduced ambulation  can increase the constipation

## 2024-10-24 NOTE — PROGRESS NOTES
[facility] [address]  Progress Note    Patient Name: Anna Suarez  MRN: [id number]  Date of Evaluation- 10/24/2024  PCP- No, Primary Doctor    Future cases for Anna Suarez [[id number]]       Case ID Status Date Time Cristian Procedure Provider Location    [id number] [facility] 11/18/2024  7:15  DISCECTOMY, SPINE, CERVICAL, ANTERIOR APPROACH, WITH FUSION Ben Pandey MD [[id number]] [facility] OR            HPI:  History of present illness- I had the pleasure of meeting this pleasant 54 y.o. lady in the pre op clinic prior to her elective spine surgery. The patient is new to me .  Offered to have family to be on the phone during the consultation      I have obtained the history by speaking to the patient and by reviewing the electronic health records.    Events leading up to surgery / History of presenting illness -    I have obtained the information by speaking to her who seems to be knowledgeable about her health    She has been troubled with pain on the right upper extremity mostly in her right hand and right forearm and gradually getting to the right upper arm  She believes that due to the arthritic changes in her neck, the nerve has been affected leading to her symptoms and she is planning on having surgery done for this on November the 18th  She has had a carpal tunnel surgery on the right hand in 2001 and if her symptoms on her right hand does not resolve, she may consider getting the carpal tunnel surgery    She has been having this discomfort about 2 years time and her pain fluctuates and can get to up to 8-9/10    Her symptoms her mostly at nighttime when she is sleeping which seems to be affecting her ability to rest and sleep and sometimes she has 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[employer]    At nighttime she switches between Tylenol/ibuprofen as she does not like taking tramadol    She also finds standing up and letting her arm hang by her side to be  helpful    With pain and numbness, on occasion she has difficulty with her right hand like opening jars-she has no problems like dropping things or balance problems    She also has numbness in the similar distribution in addition to pain    With regards to the age of her having this kind of problem, she is not aware of any significant arthritis problem in her family to her knowledge    Of told she was diagnosed with mixed connective tissue disease in 2012-and not known to have rheumatoid arthritis, lupus, antiphospholipid antibody syndrome-she was under rheumatology care but to her understanding she was not have any active rheumatological conditions        Relevant health conditions of significance for the perioperative period/ History of presenting illness -    PTSD  BMI 34.77  History of pneumonia  History of thrombosis left leg-superficial vein  Acid reflux  Constipation     Lives by herself on 2nd level apartment complex  Works full-time and the [employer] department  Pets- none  Children - none-   Pregnancies - 1  Miscarriages -2004 - 8 weeks into the gestation   C sections - none  She has   Has help post op    She is from [place] but traveled places and settled in [place] and she has help coming from [place] for a week and local help after that    Not known to have  heart problem , Stroke/ Mini stroke ,Prediabetes , Diabetes Mellitus,  Thyroid problem, Kidney problem, deep vein thrombosis, pulmonary embolism,   sleep apnea, COVID infection, fatty liver , blood vessels stent ,  edema, gout, allergies                        Subjective/ Objective:     Chief complaint-Preoperative evaluation, Perioperative Medical management, complication reduction plan     Active cardiac conditions- none    Revised cardiac risk index predictors- none    Functional capacity -Examples of physical activity  -she works at a desk, she walks on the hallways at work and she takes about 20 steps to get to her apartment  "can take 1 flight of stairs----- She can undertake all the above activities without  chest pain,chest tightness, Shortness of breath ,dizziness,lightheadedness making her exercise tolerance more,   than 4 Mets.     Review of Systems   Constitutional:  Negative for chills and fever.        No unusual weight changes       HENT:          AMALIAG score  / 8     Snoring     Elevated BP    Age over 50         Eyes:         No new visual changes   Respiratory:          No cough , phlegm    No Hemoptysis   Cardiovascular:         As noted   Gastrointestinal:         No overt GI/ blood losses  Bowel movements-  constipated  She has an IUD and does not have cycles   Endocrine:        Prednisone use > 20 mg daily for 3 weeks- none   Genitourinary:  Negative for dysuria.        No urinary hesitancy    Musculoskeletal:         As above      Skin:  Negative for rash.   Neurological:  Negative for syncope.        No unilateral weakness   Hematological:         Current use of Anticoagulants  None       No past medical history pertinent negatives.        No  bleeding, cardiac problems with previous surgeries/procedures.  Medications and Allergies reviewed in epic.   FH- No anesthesia,bleeding ,  heart disease in family    Physical Exam  Blood pressure 131/63, pulse 76, temperature 97.8 °F (36.6 °C), temperature source Oral, resp. rate 16, height 5' 1" (1.549 m), weight 83.5 kg (184 lb), SpO2 95%.    I offered a sheet and the presence of a chaperone during physical examination   She was comfortable to proceed with the exam without the the presence of a chaperone          Physical Exam  Constitutional- Vitals - Body mass index is 34.77 kg/m².,   Vitals:    10/24/24 0752   BP: 131/63   Pulse: 76   Resp: 16   Temp: 97.8 °F (36.6 °C)     General appearance-Conscious,Coherent  Eyes- No conjunctival icterus,pupils  round  and reactive to light   ENT-Oral cavity- moist    , Hearing grossly normal   Neck- No thyromegaly ,Trachea -central, " No jugular venous distension,   No Carotid Bruit   Cardiovascular -Heart Sounds- Normal  and  no murmur   , No gallop rhythm   Respiratory - Normal Respiratory Effort, Normal breath sounds,  no wheeze , and  no forced expiratory wheeze    Peripheral pitting pedal edema-- none , no calf pain   Gastrointestinal -Soft abdomen, No palpable masses, Non Tender,Liver,Spleen not palpable. No-- free fluid and shifting dullness  Musculoskeletal- No finger Clubbing. Strength grossly normal   Lymphatic-No Palpable cervical, axillary,Inguinal lymphadenopathy   Psychiatric - normal effect,Orientation  Rt Dorsalis pedis pulses-palpable    Lt Dorsalis pedis pulses- palpable   Rt Posterior tibial pulses -palpable   Left posterior tibial pulses -palpable   Miscellaneous -     no asterixis,  no renal bruit, and  Tattoo  Investigations  Lab and Imaging have been reviewed in epic.      Review of old records- Was done and information gathered regards to events leading to surgery and health conditions of significance in the perioperative period.        Preoperative cardiac risk assessment-  The patient does not have any active cardiac conditions . Revised cardiac risk index predictors- ---.Functional capacity is more than 4 Mets. She will be undergoing a Spine procedure that carries a Moderate Risk risk     Risk of a major Cardiac event ( Defined as death, myocardial infarction, or cardiac arrest at 30 days after noncardiac surgery), based on RCRI score     3.9%     No further cardiac work up is indicated prior to proceeding with the surgery     Orders Placed This Encounter    CBC Auto Differential    Comprehensive Metabolic Panel    Hemoglobin A1C    Protime-INR    Ambulatory referral/consult to Sleep Disorders    Ambulatory referral/consult to Internal Medicine    Ambulatory referral/consult to Gynecology       American Society of Anesthesiologists Physical status classification ( ASA ) class: 2     Postoperative pulmonary complication  risk assessment:      ARISCAT ( [place]et) risk index- risk class -  Low, if duration of surgery is under 2 hours, intermediate, if duration of surgery is over 2 hours       Assessment/Plan:     Cervical radiculopathy  For surgery    Insomnia  She is on Ambien nightly as needed for sleeping and she also takes medication to help her sleep so that she does not experience the pain at nighttime    History of pneumonia  She had at that time cough, phlegm and high fever  Right upper lobe consolidation, possible pneumonia   She was given antibiotic courses x3 and currently does not have any suggestions of pneumonia and has no fever cough phlegm    CT scan from August of 2024 reportedly showed  Improvement of the previously seen consolidation area in the right upper lobe with residual linear/bandlike opacity in its posterior aspect.  Scattered bilateral pulmonary micro nodules measuring 2-3 mm.  For multiple solid nodules all <6 mm, Fleischner Society 2017 guidelines recommend no routine follow up for a low risk patient, or follow up with non-contrast chest CT at 12 months after discovery in a high risk patient.    She quit tobacco about 1 year ago in October 2023    Suggested follow up    When she did smoke, she was not a heavy smoker and has had no tuberculosis contacts to her knowledge    No tuberculosis symptoms like night sweats, appetite loss or weight loss       History of thrombosis  History of thrombosis left leg-superficial vein    Jan 2016 - injectable blood thinner-for about a month    The blood clot reoccurred in May of 2016 for which she had 6 months of Xarelto that has a result of the blood clot    No blood clotting tennis in the family except that her mother had a blood clot in the past    Associated with no reduced mobility around the time of the thrombosis, no long journeys, nocancer around the time of thrombosis, no prior surgery around the time thrombosis, no Hospital stay around the time of thrombosis   she  has Mirena IUD in C2    IVC filter  - none    She currently does not feel any suggestions of blood clot knowing her symptoms that she had in the past    She has varicose veins that may have contributed to the blood clot    Increased risk of thrombosis in the peter operative period , compression stocking use discussed     Acid reflux    Does not sound Cardiac   Tips to control reflux discussed     GERD-  I suggest continuation of the Proton pump inhibitor in the perioperative period . I suggest aspiration precautions    I suggest watching fatty and spicy food  Citrus and tomato based  Alcohol, caffeine, soda  Chocolate, peppermint spearmint  Not to lay down within 3 hours after eating  Not to snack before going to bed  Sleep elevated  Watching weight  Watching anti-inflammatory medication    Contributing factors for Reflux     NSAID medication      Constipation  Longstanding constipation   She has not had a colonoscopies yet  She had no colon cancer in the family    Suggested working on bowel movements in preparation for surgery   Constipation- I suggest giving bowel movement regimen as opioid use,reduced ambulation  can increase the constipation      PTSD (post-traumatic stress disorder)  PTSD clonidine dextromethorphan-bupropion trazodone come Ambien 10 mouth nightly as needed    She believes that they were multiple episodes that may have contributed to her PTSD and she is working on desensitization    Doing good from a mental health stand point   Not under psychiatry , Therapist care   Has supportive friends   On Medication that is helping   No Suicidal / Homicidal ideation      I suggested for her to reach out to her psychiatrist about perioperative use of her mental health medication    BMI 34.0-34.9,adult    BMI 34.77    I suggest monitoring the sodium as SIADH from wellbutrin  use and hypersecretion of ADH associated with surgery can reduce sodium in the perioperative period     Weight related conditions      Known to have     HTN  Acid reflux       Not troubled with / Not known to have       Type 2 Diabetes   Prediabetes   Gout   Hyperlipidemia   Sleep apnea   Fatty liver   Osteoarthritis    Encouraged maintaining healthy weight for improved health     Snoring    Possible sleep apnea- I suggest a sleep study and suggest caution with usage of medication that can cause respiratory suppression in the perioperative period  potential ramifications of untreated sleep apnea, which could include daytime sleepiness, hypertension, heart disease and stroke were discussed  Suggested not to drive, if feels sleepy    Avoidance of  supine sleep, weight gain , alcoholic beverages , care with , sedative , CNS depressant use indicated  since all of these can worsen XUAN      I have offered her sleep evaluation that she took-she wants to do this when she is done with the surgery and healing process which perhaps could be next year    IUD (intrauterine device) in place  Mirena IUD  She started using IUD as a contraceptive  She was last sexually active with a male partner in June of 2021   She is currently not sexually active with either male or female since that time  Not pregnant to her understanding  She finds the Mirena IUD to be helpful with her mental health also    Increased risk of thrombosis discussed    MCTD (mixed connective tissue disease)  Currently not an active problem and not known to have rheumatoid arthritis, lupus, antiphospholipid antibody syndrome    PONV (postoperative nausea and vomiting)  Post operative nausea, vomiting - I suggest that the perioperative team be aware of this so that appropriate preventive care can be taken      She finds scopolamine patch to be helpful    Family history of breast cancer  Her mother has had breast cancer at a later age   I have discussed the importance of mammograms and breast cancer screening with self-exam and clinical exam        Preventive perioperative care    Thromboembolic  prophylaxis:  Her risk factors for thrombosis include previous history of thrombosis and age.I suggest  thromboembolic prophylaxis ( mechanical/pharmacological, weighing the risk benefits of pharmacological agent use considering peter procedural bleeding )  during the perioperative period.I suggested being active in the post operative period.      Postoperative pulmonary complication prophylaxis-- I suggest incentive spirometry use, early ambulation, and end tidal carbon dioxide monitoring  , oral care , head end of bed elevation      Renal complication prophylaxis- . I suggest keeping her well hydrated   in the perioperative period.      Surgical site Infection Prophylaxis-I  suggest appropriate antibiotic for Prophylaxis against Surgical site infections  No reported Staph infection  Skin antibacterial discussed          In view of Spine procedure the patient  is at risk of postoperative urinary retention.  I suggest avoidance / minimizing the of  Benzodiazepines,Anticholinergic medication,antihistamines ( Benadryl) , if possible in the perioperative period. I suggest using the minimum possible use of opioids for the minimum period of time in the perioperative period. Benadryl avoidance suggested      This visit was focused on Preoperative evaluation, Perioperative Medical management, complication reduction plans. I suggest that the patient follows up with primary care or relevant sub specialists for ongoing health care.    I appreciate the opportunity to be involved in this patients care. Please feel free to contact me if there were any questions about this consultation.    Patient is optimized    Patient/ care giver/ Family member was instructed to call and update me about any changes to health,  medication, office visits ,testing out side of the [facility] care center , hospitalizations between now and surgery      Nandini Peres MD  Internal Medicine  [facility]   Cell Phone- [phone]  [phone]    History of COVID - no   COVID vaccination status -Yes    COVID screening     No fever   No cough   No SOB  No sore throat   No loss of taste or smell   No muscle aches   No nausea, vomiting , diarrhea -    I have spent --78---- minutes of time which includes, time spent to prepare to see the patient , obtaining history ,performing examination, counseling/Educating the patient , Documenting clinical information in the record    --    10/     Checked for over-the-counter medication    Lab tests are acceptable for surgery  Called to speak to her-unable to speak to her-left a message that her lab tests are acceptable for surgery

## 2024-10-24 NOTE — ASSESSMENT & PLAN NOTE
History of thrombosis left leg-superficial vein    Jan 2016 - injectable blood thinner-for about a month    The blood clot reoccurred in May of 2016 for which she had 6 months of Xarelto that has a result of the blood clot    No blood clotting tennis in the family except that her mother had a blood clot in the past    Associated with no reduced mobility around the time of the thrombosis, no long journeys, nocancer around the time of thrombosis, no prior surgery around the time thrombosis, no Hospital stay around the time of thrombosis   she has Mirena IUD in C2    IVC filter  - none    She currently does not feel any suggestions of blood clot knowing her symptoms that she had in the past    She has varicose veins that may have contributed to the blood clot    Increased risk of thrombosis in the peter operative period , compression stocking use discussed

## 2024-10-24 NOTE — OUTPATIENT SUBJECTIVE & OBJECTIVE
"Outpatient Subjective & Objective     Chief complaint-Preoperative evaluation, Perioperative Medical management, complication reduction plan     Active cardiac conditions- none    Revised cardiac risk index predictors- none    Functional capacity -Examples of physical activity  -she works at a desk, she walks on the hallways at work and she takes about 20 steps to get to her apartment can take 1 flight of stairs----- She can undertake all the above activities without  chest pain,chest tightness, Shortness of breath ,dizziness,lightheadedness making her exercise tolerance more,   than 4 Mets.     Review of Systems   Constitutional:  Negative for chills and fever.        No unusual weight changes       HENT:          STOPBANG score  / 8     Snoring     Elevated BP    Age over 50         Eyes:         No new visual changes   Respiratory:          No cough , phlegm    No Hemoptysis   Cardiovascular:         As noted   Gastrointestinal:         No overt GI/ blood losses  Bowel movements-  constipated  She has an IUD and does not have cycles   Endocrine:        Prednisone use > 20 mg daily for 3 weeks- none   Genitourinary:  Negative for dysuria.        No urinary hesitancy    Musculoskeletal:         As above      Skin:  Negative for rash.   Neurological:  Negative for syncope.        No unilateral weakness   Hematological:         Current use of Anticoagulants  None       No past medical history pertinent negatives.        No  bleeding, cardiac problems with previous surgeries/procedures.  Medications and Allergies reviewed in epic.   FH- No anesthesia,bleeding ,  heart disease in family    Physical Exam  Blood pressure 131/63, pulse 76, temperature 97.8 °F (36.6 °C), temperature source Oral, resp. rate 16, height 5' 1" (1.549 m), weight 83.5 kg (184 lb), SpO2 95%.    I offered a sheet and the presence of a chaperone during physical examination   She was comfortable to proceed with the exam without the the presence of " a chaperone          Physical Exam  Constitutional- Vitals - Body mass index is 34.77 kg/m².,   Vitals:    10/24/24 0752   BP: 131/63   Pulse: 76   Resp: 16   Temp: 97.8 °F (36.6 °C)     General appearance-Conscious,Coherent  Eyes- No conjunctival icterus,pupils  round  and reactive to light   ENT-Oral cavity- moist    , Hearing grossly normal   Neck- No thyromegaly ,Trachea -central, No jugular venous distension,   No Carotid Bruit   Cardiovascular -Heart Sounds- Normal  and  no murmur   , No gallop rhythm   Respiratory - Normal Respiratory Effort, Normal breath sounds,  no wheeze , and  no forced expiratory wheeze    Peripheral pitting pedal edema-- none , no calf pain   Gastrointestinal -Soft abdomen, No palpable masses, Non Tender,Liver,Spleen not palpable. No-- free fluid and shifting dullness  Musculoskeletal- No finger Clubbing. Strength grossly normal   Lymphatic-No Palpable cervical, axillary,Inguinal lymphadenopathy   Psychiatric - normal effect,Orientation  Rt Dorsalis pedis pulses-palpable    Lt Dorsalis pedis pulses- palpable   Rt Posterior tibial pulses -palpable   Left posterior tibial pulses -palpable   Miscellaneous -     no asterixis,  no renal bruit, and  Tattoo  Investigations  Lab and Imaging have been reviewed in epic.      Review of old records- Was done and information gathered regards to events leading to surgery and health conditions of significance in the perioperative period.    Outpatient Subjective & Objective

## 2024-11-04 ENCOUNTER — HOSPITAL ENCOUNTER (OUTPATIENT)
Dept: PREADMISSION TESTING | Facility: HOSPITAL | Age: 54
Discharge: HOME | End: 2024-11-04
Attending: STUDENT IN AN ORGANIZED HEALTH CARE EDUCATION/TRAINING PROGRAM
Payer: COMMERCIAL

## 2024-11-04 VITALS
SYSTOLIC BLOOD PRESSURE: 151 MMHG | TEMPERATURE: 99 F | HEIGHT: 61 IN | HEART RATE: 88 BPM | WEIGHT: 189.06 LBS | DIASTOLIC BLOOD PRESSURE: 95 MMHG | OXYGEN SATURATION: 95 % | BODY MASS INDEX: 35.7 KG/M2 | RESPIRATION RATE: 18 BRPM

## 2024-11-04 DIAGNOSIS — Z01.818 PREOPERATIVE TESTING: Primary | ICD-10-CM

## 2024-11-04 PROCEDURE — 93010 ELECTROCARDIOGRAM REPORT: CPT | Mod: ,,, | Performed by: INTERNAL MEDICINE

## 2024-11-04 PROCEDURE — 93005 ELECTROCARDIOGRAM TRACING: CPT

## 2024-11-04 NOTE — ANESTHESIA PREPROCEDURE EVALUATION
Anna Suarez is a 54 y.o., female  To undergo Procedure(s) (LRB):  DISCECTOMY, SPINE, CERVICAL, ANTERIOR APPROACH, WITH FUSION (N/A)     Denies CP/SOB/MI/CVA/URI symptoms.  Endorses occasional GERD.  METS > 4  NPO > 8    Past Medical History:  Past Medical History:   Diagnosis Date    Anxiety     Arthritis     Depression     GERD (gastroesophageal reflux disease)     History of pneumonia 05/2024    Obesity, unspecified     PONV (postoperative nausea and vomiting)     PTSD (post-traumatic stress disorder)        Past Surgical History:  Past Surgical History:   Procedure Laterality Date    CARPAL TUNNEL RELEASE Right 2001    CARPAL TUNNEL RELEASE Left 2023    CERVICAL BIOPSY  W/ LOOP ELECTRODE EXCISION N/A     CHOLECYSTECTOMY N/A 2003    e sure implant Left 2012    REMOVAL OF INTRAUTERINE DEVICE (IUD) N/A 2019    UTERINE FIBROID EMBOLIZATION N/A     2008       Social History:  Social History     Socioeconomic History    Marital status:    Tobacco Use    Smoking status: Former     Types: Cigarettes    Smokeless tobacco: Never    Tobacco comments:     Quit tobacco October 20, 2023   Substance and Sexual Activity    Alcohol use: Yes     Alcohol/week: 4.0 standard drinks of alcohol     Types: 4 Shots of liquor per week    Drug use: Not Currently     Types: Marijuana    Sexual activity: Not Currently     Partners: Female, Male     Birth control/protection: I.U.D.   Social History Narrative     in 2008     Social Drivers of Health     Financial Resource Strain: Patient Declined (7/25/2024)    Overall Financial Resource Strain (CARDIA)     Difficulty of Paying Living Expenses: Patient declined   Food Insecurity: Patient Declined (7/25/2024)    Hunger Vital Sign     Worried About Running Out of Food in the Last Year: Patient declined     Ran Out of Food in the Last Year: Patient declined   Physical Activity: Unknown (7/25/2024)    Exercise Vital Sign     Days of Exercise per Week: 2 days     Minutes of  Exercise per Session: Patient declined   Stress: Patient Declined (7/25/2024)    [employer] - Occupational Stress Questionnaire     Feeling of Stress : Patient declined   Housing Stability: Unknown (7/25/2024)    Housing Stability Vital Sign     Unable to Pay for Housing in the Last Year: Patient declined       Medications:  No current facility-administered medications on file prior to encounter.     Current Outpatient Medications on File Prior to Encounter   Medication Sig Dispense Refill    cyclobenzaprine (FLEXERIL) 10 MG tablet Take 1 tablet (10 mg total) by mouth as needed for Muscle spasms (nightly). 30 tablet 2    dextromethorphan-bupropion (AUVELITY)  mg TbIE Take 1 tablet by mouth 2 (two) times a day.      gabapentin (NEURONTIN) 300 MG capsule Take 900 mg by mouth every evening. As needed      traZODone (DESYREL) 100 MG tablet Take 100 mg by mouth every evening.      zolpidem (AMBIEN) 10 mg Tab Take 10 mg by mouth nightly as needed.         Allergies:  Review of patient's allergies indicates:  No Known Allergies    Active Problems:  Patient Active Problem List   Diagnosis    Cervical radiculopathy    Insomnia    Cervical spondylosis without myelopathy    History of pneumonia    History of thrombosis    Acid reflux    Constipation    PTSD (post-traumatic stress disorder)    BMI 34.0-34.9,adult    Snoring    IUD (intrauterine device) in place    MCTD (mixed connective tissue disease)    PONV (postoperative nausea and vomiting)    Family history of breast cancer       Diagnostic Studies:   Latest Reference Range & Units 10/24/24 09:44   WBC 3.90 - 12.70 K/uL 7.46   RBC 4.00 - 5.40 M/uL 4.61   Hemoglobin 12.0 - 16.0 g/dL 13.5   Hematocrit 37.0 - 48.5 % 42.9   MCV 82 - 98 fL 93   MCH 27.0 - 31.0 pg 29.3   MCHC 32.0 - 36.0 g/dL 31.5 (L)   RDW 11.5 - 14.5 % 13.8   Platelet Count 150 - 450 K/uL 261   MPV 9.2 - 12.9 fL 10.3   Gran % 38.0 - 73.0 % 59.6   Lymph % 18.0 - 48.0 % 28.0    Mono % 4.0 - 15.0 % 9.0   Eos % 0.0 - 8.0 % 2.8   Basophil % 0.0 - 1.9 % 0.3   Immature Granulocytes 0.0 - 0.5 % 0.3   Gran # (ANC) 1.8 - 7.7 K/uL 4.5   Lymph # 1.0 - 4.8 K/uL 2.1   Mono # 0.3 - 1.0 K/uL 0.7   Eos # 0.0 - 0.5 K/uL 0.2   Baso # 0.00 - 0.20 K/uL 0.02   Immature Grans (Abs) 0.00 - 0.04 K/uL 0.02   nRBC 0 /100 WBC 0   Differential Method  Automated      Latest Reference Range & Units 10/24/24 09:44   Sodium 136 - 145 mmol/L 139   Potassium 3.5 - 5.1 mmol/L 4.2   Chloride 95 - 110 mmol/L 104   CO2 23 - 29 mmol/L 27   Anion Gap 8 - 16 mmol/L 8   BUN 6 - 20 mg/dL 19   Creatinine 0.5 - 1.4 mg/dL 0.8   eGFR >60 mL/min/1.73 m^2 >60.0   Glucose 70 - 110 mg/dL 93   Calcium 8.7 - 10.5 mg/dL 9.3   ALP 40 - 150 U/L 62   PROTEIN TOTAL 6.0 - 8.4 g/dL 7.6   Albumin 3.5 - 5.2 g/dL 3.9   BILIRUBIN TOTAL 0.1 - 1.0 mg/dL 0.3   AST 10 - 40 U/L 17   ALT 10 - 44 U/L 12     EKG (11/4/24):  NSR    24 Hour Vitals:  Temp:  [36.8 °C (98.3 °F)] 36.8 °C (98.3 °F)  Pulse:  [75] 75  Resp:  [18] 18  SpO2:  [98 %] 98 %  BP: (173)/(75) 173/75   See Nursing Charting For Additional Vitals    Pre-op Assessment    I have reviewed the Patient Summary Reports.     I have reviewed the Nursing Notes. I have reviewed the NPO Status.   I have reviewed the Medications.     Review of Systems  Anesthesia Hx:    PONV           Denies Family Hx of Anesthesia complications.   Personal Hx of Anesthesia complications, Post-Operative Nausea/Vomiting                    Social:  Social Alcohol Use, Former Smoker       Hematology/Oncology:                   Hematology Comments: thrombosis left leg-superficial vein-2016 completed 6 mos Yunior was in [place] at the time                    Cardiovascular:  Cardiovascular Normal Exercise tolerance: good                 ECG has been reviewed.    Functional Capacity good / => 4 METS                         Pulmonary:  Pulmonary Normal                       Hepatic/GI:     GERD, well controlled                 Musculoskeletal:  Arthritis          Spine Disorders: cervical            Neurological:  Neurology Normal                                      Endocrine:        Obesity / BMI > 30  Psych:  Psychiatric History  depression PTSD               Physical Exam  General: Well nourished and Cooperative    Airway:  Mallampati: III   Mouth Opening: Normal  TM Distance: Normal    Dental:  Intact    Chest/Lungs:  Clear to auscultation, Normal Respiratory Rate    Heart:  Rate: Normal  Rhythm: Regular Rhythm        Anesthesia Plan  Type of Anesthesia, risks & benefits discussed:    Anesthesia Type: Gen ETT  Intra-op Monitoring Plan: Standard ASA Monitors and Art Line  Post Op Pain Control Plan: multimodal analgesia and IV/PO Opioids PRN  Induction:  IV  Airway Plan: Direct and Video, Post-Induction  Informed Consent: Informed consent signed with the Patient and all parties understand the risks and agree with anesthesia plan.  All questions answered. Patient consented to blood products? Yes  ASA Score: 2  Anesthesia Plan Notes:   GA with OETT  Standard ASA monitors, A-Line  Recovery in PACU  PONV: 4    Ready For Surgery From Anesthesia Perspective.     .

## 2024-11-04 NOTE — DISCHARGE INSTRUCTIONS
YOUR PROCEDURE WILL BE AT [facility] at [address] [address]                 Enter through the Main Entrance facing [address].                 Report to the Same Day Surgery Registration Desk in the hallway.(Just beside the Same Day Surgery Unit)      Your procedure  is scheduled for ___11/18/2024_______.    Call [phone] between 2pm and 5pm on __11/15/2024_____to find out your arrival time for the day of surgery.    You may have two visitors.  No children under 12 years old.     You will be going to the Same Day Surgery Unit on the 2nd floor of the hospital.    Important instructions:  Do not eat anything after midnight.  You may have plain water, non carbonated.  You may also have Gatorade or Powerade after midnight.    Stop all fluids 2 hours before your surgery.    It is okay to brush your teeth.  Do not have gum, candy or mints.    SEE MEDICATION SHEET.   TAKE MEDICATIONS AS DIRECTED.      All GLP-1 weekly diabetic/weight loss medications must not be taken for one week before your surgery, or your surgery could be canceled.      STOP taking for 7 days before surgery:    Aspirin           Voltaren (Diclofenac)  Ibuprofen  (Advil, Motrin)                Indomethocin  Mobic (meloxicam, celebrex)      Etodolac   Aleve (naproxen)          Toradol (ketoralac)  Fish oil, Krill oil and Vitamin E  Headache Powders (BC Powder, Goody's Powder, Stanback)                           You may take Tylenol if needed which is not a blood thinner.    Please shower the night before and the morning of your surgery.        Use Chlorhexidine soap as instructed by your pre op nurse.   Please place clean linens on your bed the night before surgery. Please wear fresh clean clothing after each shower.    No shaving of procedural area at least 4-5 days before surgery due to increased risk of skin irritation and/or possible infection.    Female patients may be asked for a urine  specimen on the morning of the surgery.  Please check with your nurse before using the restroom.    Contact lenses and removable denture work may not be worn during your procedure.    You may wear deodorant only. If you are having breast surgery, do not wear deodorant on the operative side.    Do not wear powder, body lotion, perfume/cologne or make-up.    Do not wear any jewelry or have any metal on your body.    You will be asked to remove any dentures or partials for the procedure.    If you are going home on the same day of surgery, you must arrange for a family member or a friend to drive you home.  Public transportation is prohibited.  You will not be able to drive home if you were given anesthesia or sedation.    Patients who want to have their Post-op prescriptions filled from our in-house [facility], bring a Credit/Debit Card or cash with you. A co-pay may be required.  The pharmacy closes at 5:30 pm.    Wear loose fitting clothes allowing for bandages.    Please leave money and valuables home.      You may bring your cell phone.    Call the doctor if fever or illness should occur before your surgery.    Call [phone] to contact us here if needed.                            CLOTHES ON DAY OF SURGERY    SHOULDER surgery:  you must have a very oversized shirt.  Very, Very large.  You will probably have a large sling on with your arm strapped to your chest.  You will not be able to put the arm of the operated shoulder into a sleeve.  You can put the arm of the un-operated shoulder into the sleeve, but the shirt will need to be draped over the operated shoulder.       ARM or HAND surgery:  make sure that your sleeves are large and loose enough to pass over large dressings or cast.      BREAST or UNDERARM surgery:  wear a loose, button down shirt so that you can dress without raising your arms over your head.    ABDOMINAL surgery:  wear loose, comfortable clothing.  Nothing tight around the abdomen.  NO  JEANS    PENIS or SCROTAL surgery:  loose comfortable clothing.  Large sweat pants, pajama pants or a robe.  ABSOLUTELY NO JEANS      LEG or FOOT surgery:  wear large loose pants that are able to pass over any large dressings or casts.  You could also wear loose shorts or a skirt.

## 2024-11-05 LAB
OHS QRS DURATION: 94 MS
OHS QTC CALCULATION: 425 MS

## 2024-11-13 NOTE — PRE ADMISSION SCREENING
Message sent to Dr Pandey to notify him that  a consent, orders and H/P are needed for the surgery on 11/18/24.

## 2024-11-15 ENCOUNTER — TELEPHONE (OUTPATIENT)
Dept: SURGERY | Facility: HOSPITAL | Age: 54
End: 2024-11-15
Payer: COMMERCIAL

## 2024-11-18 ENCOUNTER — PATIENT MESSAGE (OUTPATIENT)
Dept: NEUROSURGERY | Facility: CLINIC | Age: 54
End: 2024-11-18
Payer: COMMERCIAL

## 2024-11-18 ENCOUNTER — HOSPITAL ENCOUNTER (OUTPATIENT)
Facility: HOSPITAL | Age: 54
Discharge: HOME | End: 2024-11-18
Attending: STUDENT IN AN ORGANIZED HEALTH CARE EDUCATION/TRAINING PROGRAM | Admitting: STUDENT IN AN ORGANIZED HEALTH CARE EDUCATION/TRAINING PROGRAM
Payer: COMMERCIAL

## 2024-11-18 ENCOUNTER — ANESTHESIA (OUTPATIENT)
Dept: SURGERY | Facility: HOSPITAL | Age: 54
End: 2024-11-18
Payer: COMMERCIAL

## 2024-11-18 VITALS
RESPIRATION RATE: 18 BRPM | HEART RATE: 84 BPM | BODY MASS INDEX: 35.71 KG/M2 | OXYGEN SATURATION: 96 % | SYSTOLIC BLOOD PRESSURE: 148 MMHG | WEIGHT: 189 LBS | TEMPERATURE: 98 F | DIASTOLIC BLOOD PRESSURE: 75 MMHG

## 2024-11-18 DIAGNOSIS — M48.02 CERVICAL STENOSIS OF SPINAL CANAL: ICD-10-CM

## 2024-11-18 DIAGNOSIS — M54.12 CERVICAL RADICULOPATHY: ICD-10-CM

## 2024-11-18 DIAGNOSIS — Z98.1 S/P CERVICAL SPINAL FUSION: Primary | ICD-10-CM

## 2024-11-18 DIAGNOSIS — Z01.818 PREOPERATIVE TESTING: ICD-10-CM

## 2024-11-18 LAB
ABO GROUP BLD: NORMAL
B-HCG UR QL: NEGATIVE
BLD GP AB SCN CELLS X3 SERPL QL: NORMAL
CTP QC/QA: YES
RH BLD: NORMAL

## 2024-11-18 PROCEDURE — 22551 ARTHRD ANT NTRBDY CERVICAL: CPT | Mod: ,,, | Performed by: STUDENT IN AN ORGANIZED HEALTH CARE EDUCATION/TRAINING PROGRAM

## 2024-11-18 PROCEDURE — 71000039 HC RECOVERY, EACH ADD'L HOUR: Performed by: STUDENT IN AN ORGANIZED HEALTH CARE EDUCATION/TRAINING PROGRAM

## 2024-11-18 PROCEDURE — 25000003 PHARM REV CODE 250: Performed by: PHYSICIAN ASSISTANT

## 2024-11-18 PROCEDURE — 63600175 PHARM REV CODE 636 W HCPCS: Performed by: ANESTHESIOLOGY

## 2024-11-18 PROCEDURE — 22845 INSERT SPINE FIXATION DEVICE: CPT | Mod: 59,AS,, | Performed by: PHYSICIAN ASSISTANT

## 2024-11-18 PROCEDURE — C1889 IMPLANT/INSERT DEVICE, NOC: HCPCS | Performed by: STUDENT IN AN ORGANIZED HEALTH CARE EDUCATION/TRAINING PROGRAM

## 2024-11-18 PROCEDURE — 71000033 HC RECOVERY, INTIAL HOUR: Performed by: STUDENT IN AN ORGANIZED HEALTH CARE EDUCATION/TRAINING PROGRAM

## 2024-11-18 PROCEDURE — C1713 ANCHOR/SCREW BN/BN,TIS/BN: HCPCS | Performed by: STUDENT IN AN ORGANIZED HEALTH CARE EDUCATION/TRAINING PROGRAM

## 2024-11-18 PROCEDURE — C1751 CATH, INF, PER/CENT/MIDLINE: HCPCS | Performed by: ANESTHESIOLOGY

## 2024-11-18 PROCEDURE — 25000003 PHARM REV CODE 250: Performed by: STUDENT IN AN ORGANIZED HEALTH CARE EDUCATION/TRAINING PROGRAM

## 2024-11-18 PROCEDURE — 71000016 HC POSTOP RECOV ADDL HR: Performed by: STUDENT IN AN ORGANIZED HEALTH CARE EDUCATION/TRAINING PROGRAM

## 2024-11-18 PROCEDURE — 27201423 OPTIME MED/SURG SUP & DEVICES STERILE SUPPLY: Performed by: STUDENT IN AN ORGANIZED HEALTH CARE EDUCATION/TRAINING PROGRAM

## 2024-11-18 PROCEDURE — 36000711: Performed by: STUDENT IN AN ORGANIZED HEALTH CARE EDUCATION/TRAINING PROGRAM

## 2024-11-18 PROCEDURE — 36415 COLL VENOUS BLD VENIPUNCTURE: CPT | Performed by: STUDENT IN AN ORGANIZED HEALTH CARE EDUCATION/TRAINING PROGRAM

## 2024-11-18 PROCEDURE — 63600175 PHARM REV CODE 636 W HCPCS: Performed by: STUDENT IN AN ORGANIZED HEALTH CARE EDUCATION/TRAINING PROGRAM

## 2024-11-18 PROCEDURE — 86850 RBC ANTIBODY SCREEN: CPT | Performed by: STUDENT IN AN ORGANIZED HEALTH CARE EDUCATION/TRAINING PROGRAM

## 2024-11-18 PROCEDURE — 36620 INSERTION CATHETER ARTERY: CPT | Performed by: ANESTHESIOLOGY

## 2024-11-18 PROCEDURE — 22551 ARTHRD ANT NTRBDY CERVICAL: CPT | Mod: AS,,, | Performed by: PHYSICIAN ASSISTANT

## 2024-11-18 PROCEDURE — 22853 INSJ BIOMECHANICAL DEVICE: CPT | Mod: AS,,, | Performed by: PHYSICIAN ASSISTANT

## 2024-11-18 PROCEDURE — 37000008 HC ANESTHESIA 1ST 15 MINUTES: Performed by: STUDENT IN AN ORGANIZED HEALTH CARE EDUCATION/TRAINING PROGRAM

## 2024-11-18 PROCEDURE — 71000015 HC POSTOP RECOV 1ST HR: Performed by: STUDENT IN AN ORGANIZED HEALTH CARE EDUCATION/TRAINING PROGRAM

## 2024-11-18 PROCEDURE — 22845 INSERT SPINE FIXATION DEVICE: CPT | Mod: 59,,, | Performed by: STUDENT IN AN ORGANIZED HEALTH CARE EDUCATION/TRAINING PROGRAM

## 2024-11-18 PROCEDURE — 81025 URINE PREGNANCY TEST: CPT | Performed by: STUDENT IN AN ORGANIZED HEALTH CARE EDUCATION/TRAINING PROGRAM

## 2024-11-18 PROCEDURE — 36000710: Performed by: STUDENT IN AN ORGANIZED HEALTH CARE EDUCATION/TRAINING PROGRAM

## 2024-11-18 PROCEDURE — 20930 SP BONE ALGRFT MORSEL ADD-ON: CPT | Mod: ,,, | Performed by: STUDENT IN AN ORGANIZED HEALTH CARE EDUCATION/TRAINING PROGRAM

## 2024-11-18 PROCEDURE — 22853 INSJ BIOMECHANICAL DEVICE: CPT | Mod: ,,, | Performed by: STUDENT IN AN ORGANIZED HEALTH CARE EDUCATION/TRAINING PROGRAM

## 2024-11-18 PROCEDURE — 63600175 PHARM REV CODE 636 W HCPCS: Performed by: PHYSICIAN ASSISTANT

## 2024-11-18 PROCEDURE — 37000009 HC ANESTHESIA EA ADD 15 MINS: Performed by: STUDENT IN AN ORGANIZED HEALTH CARE EDUCATION/TRAINING PROGRAM

## 2024-11-18 DEVICE — IMPLANTABLE DEVICE: Type: IMPLANTABLE DEVICE | Site: NECK | Status: FUNCTIONAL

## 2024-11-18 RX ORDER — PHENYLEPHRINE HYDROCHLORIDE 10 MG/ML
INJECTION INTRAVENOUS
Status: DISCONTINUED | OUTPATIENT
Start: 2024-11-18 | End: 2024-11-18

## 2024-11-18 RX ORDER — GENTAMICIN 40 MG/ML
INJECTION, SOLUTION INTRAMUSCULAR; INTRAVENOUS
Status: DISCONTINUED | OUTPATIENT
Start: 2024-11-18 | End: 2024-11-18 | Stop reason: HOSPADM

## 2024-11-18 RX ORDER — ONDANSETRON HYDROCHLORIDE 2 MG/ML
4 INJECTION, SOLUTION INTRAVENOUS DAILY PRN
Status: DISCONTINUED | OUTPATIENT
Start: 2024-11-18 | End: 2024-11-18 | Stop reason: HOSPADM

## 2024-11-18 RX ORDER — ROCURONIUM BROMIDE 10 MG/ML
INJECTION, SOLUTION INTRAVENOUS
Status: DISCONTINUED | OUTPATIENT
Start: 2024-11-18 | End: 2024-11-18

## 2024-11-18 RX ORDER — CEFAZOLIN 2 G/1
2 INJECTION, POWDER, FOR SOLUTION INTRAMUSCULAR; INTRAVENOUS
Status: COMPLETED | OUTPATIENT
Start: 2024-11-18 | End: 2024-11-18

## 2024-11-18 RX ORDER — MUPIROCIN 20 MG/G
1 OINTMENT TOPICAL
Status: COMPLETED | OUTPATIENT
Start: 2024-11-18 | End: 2024-11-18

## 2024-11-18 RX ORDER — SODIUM CHLORIDE 9 MG/ML
INJECTION, SOLUTION INTRAVENOUS CONTINUOUS
Status: DISCONTINUED | OUTPATIENT
Start: 2024-11-18 | End: 2024-11-18 | Stop reason: HOSPADM

## 2024-11-18 RX ORDER — MUPIROCIN 20 MG/G
OINTMENT TOPICAL
Status: DISCONTINUED | OUTPATIENT
Start: 2024-11-18 | End: 2024-11-18 | Stop reason: HOSPADM

## 2024-11-18 RX ORDER — HYDROCODONE BITARTRATE AND ACETAMINOPHEN 5; 325 MG/1; MG/1
1 TABLET ORAL ONCE
Status: COMPLETED | OUTPATIENT
Start: 2024-11-18 | End: 2024-11-18

## 2024-11-18 RX ORDER — HYDROCODONE BITARTRATE AND ACETAMINOPHEN 10; 325 MG/1; MG/1
1 TABLET ORAL EVERY 4 HOURS PRN
Qty: 42 TABLET | Refills: 0 | Status: SHIPPED | OUTPATIENT
Start: 2024-11-18

## 2024-11-18 RX ORDER — LIDOCAINE HYDROCHLORIDE 20 MG/ML
INJECTION INTRAVENOUS
Status: DISCONTINUED | OUTPATIENT
Start: 2024-11-18 | End: 2024-11-18

## 2024-11-18 RX ORDER — HYDROMORPHONE HYDROCHLORIDE 2 MG/ML
INJECTION, SOLUTION INTRAMUSCULAR; INTRAVENOUS; SUBCUTANEOUS
Status: DISCONTINUED | OUTPATIENT
Start: 2024-11-18 | End: 2024-11-18

## 2024-11-18 RX ORDER — MIDAZOLAM HYDROCHLORIDE 1 MG/ML
INJECTION INTRAMUSCULAR; INTRAVENOUS
Status: DISCONTINUED | OUTPATIENT
Start: 2024-11-18 | End: 2024-11-18

## 2024-11-18 RX ORDER — DEXAMETHASONE SODIUM PHOSPHATE 4 MG/ML
INJECTION, SOLUTION INTRA-ARTICULAR; INTRALESIONAL; INTRAMUSCULAR; INTRAVENOUS; SOFT TISSUE
Status: DISCONTINUED | OUTPATIENT
Start: 2024-11-18 | End: 2024-11-18

## 2024-11-18 RX ORDER — ONDANSETRON HYDROCHLORIDE 2 MG/ML
INJECTION, SOLUTION INTRAVENOUS
Status: DISCONTINUED | OUTPATIENT
Start: 2024-11-18 | End: 2024-11-18

## 2024-11-18 RX ORDER — GLUCAGON 1 MG
1 KIT INJECTION
Status: DISCONTINUED | OUTPATIENT
Start: 2024-11-18 | End: 2024-11-18 | Stop reason: HOSPADM

## 2024-11-18 RX ORDER — SODIUM CHLORIDE 0.9 % (FLUSH) 0.9 %
10 SYRINGE (ML) INJECTION
Status: DISCONTINUED | OUTPATIENT
Start: 2024-11-18 | End: 2024-11-18 | Stop reason: HOSPADM

## 2024-11-18 RX ORDER — HALOPERIDOL 5 MG/ML
0.5 INJECTION INTRAMUSCULAR EVERY 10 MIN PRN
Status: DISCONTINUED | OUTPATIENT
Start: 2024-11-18 | End: 2024-11-18 | Stop reason: HOSPADM

## 2024-11-18 RX ORDER — LIDOCAINE HYDROCHLORIDE AND EPINEPHRINE 10; 10 UG/ML; MG/ML
INJECTION, SOLUTION INFILTRATION; PERINEURAL
Status: DISCONTINUED | OUTPATIENT
Start: 2024-11-18 | End: 2024-11-18 | Stop reason: HOSPADM

## 2024-11-18 RX ORDER — DEXMEDETOMIDINE HYDROCHLORIDE 100 UG/ML
INJECTION, SOLUTION INTRAVENOUS
Status: DISCONTINUED | OUTPATIENT
Start: 2024-11-18 | End: 2024-11-18

## 2024-11-18 RX ORDER — KETAMINE HYDROCHLORIDE 100 MG/ML
INJECTION, SOLUTION INTRAMUSCULAR; INTRAVENOUS
Status: DISCONTINUED | OUTPATIENT
Start: 2024-11-18 | End: 2024-11-18

## 2024-11-18 RX ORDER — LIDOCAINE HYDROCHLORIDE AND EPINEPHRINE 10; 20 UG/ML; MG/ML
INJECTION, SOLUTION INFILTRATION; PERINEURAL
Status: DISCONTINUED | OUTPATIENT
Start: 2024-11-18 | End: 2024-11-18 | Stop reason: HOSPADM

## 2024-11-18 RX ORDER — HYDROMORPHONE HYDROCHLORIDE 2 MG/ML
0.2 INJECTION, SOLUTION INTRAMUSCULAR; INTRAVENOUS; SUBCUTANEOUS EVERY 5 MIN PRN
Status: DISCONTINUED | OUTPATIENT
Start: 2024-11-18 | End: 2024-11-18 | Stop reason: HOSPADM

## 2024-11-18 RX ORDER — PROPOFOL 10 MG/ML
VIAL (ML) INTRAVENOUS
Status: DISCONTINUED | OUTPATIENT
Start: 2024-11-18 | End: 2024-11-18

## 2024-11-18 RX ORDER — FENTANYL CITRATE 50 UG/ML
INJECTION, SOLUTION INTRAMUSCULAR; INTRAVENOUS
Status: DISCONTINUED | OUTPATIENT
Start: 2024-11-18 | End: 2024-11-18

## 2024-11-18 RX ORDER — ONDANSETRON 4 MG/1
4 TABLET, ORALLY DISINTEGRATING ORAL EVERY 6 HOURS PRN
Qty: 15 TABLET | Refills: 0 | Status: SHIPPED | OUTPATIENT
Start: 2024-11-18

## 2024-11-18 RX ADMIN — ROCURONIUM BROMIDE 50 MG: 10 INJECTION INTRAVENOUS at 07:11

## 2024-11-18 RX ADMIN — DEXAMETHASONE SODIUM PHOSPHATE 4 MG: 4 INJECTION, SOLUTION INTRAMUSCULAR; INTRAVENOUS at 07:11

## 2024-11-18 RX ADMIN — PROPOFOL 30 MG: 10 INJECTION, EMULSION INTRAVENOUS at 07:11

## 2024-11-18 RX ADMIN — CEFAZOLIN 2 G: 2 INJECTION, POWDER, FOR SOLUTION INTRAMUSCULAR; INTRAVENOUS at 07:11

## 2024-11-18 RX ADMIN — DEXMEDETOMIDINE HYDROCHLORIDE 8 MCG: 100 INJECTION, SOLUTION INTRAVENOUS at 10:11

## 2024-11-18 RX ADMIN — ROCURONIUM BROMIDE 20 MG: 10 INJECTION INTRAVENOUS at 08:11

## 2024-11-18 RX ADMIN — HYDROMORPHONE HYDROCHLORIDE 0.2 MG: 2 INJECTION INTRAMUSCULAR; INTRAVENOUS; SUBCUTANEOUS at 10:11

## 2024-11-18 RX ADMIN — HYDROMORPHONE HYDROCHLORIDE 0.2 MG: 2 INJECTION INTRAMUSCULAR; INTRAVENOUS; SUBCUTANEOUS at 11:11

## 2024-11-18 RX ADMIN — KETAMINE HYDROCHLORIDE 30 MG: 100 INJECTION, SOLUTION, CONCENTRATE INTRAMUSCULAR; INTRAVENOUS at 07:11

## 2024-11-18 RX ADMIN — PROPOFOL 50 MG: 10 INJECTION, EMULSION INTRAVENOUS at 07:11

## 2024-11-18 RX ADMIN — LIDOCAINE HYDROCHLORIDE 100 MG: 20 INJECTION, SOLUTION INTRAVENOUS at 07:11

## 2024-11-18 RX ADMIN — PHENYLEPHRINE HYDROCHLORIDE 100 MCG: 10 INJECTION INTRAVENOUS at 08:11

## 2024-11-18 RX ADMIN — HYDROCODONE BITARTRATE AND ACETAMINOPHEN 1 TABLET: 5; 325 TABLET ORAL at 01:11

## 2024-11-18 RX ADMIN — MIDAZOLAM HYDROCHLORIDE 2 MG: 1 INJECTION INTRAMUSCULAR; INTRAVENOUS at 07:11

## 2024-11-18 RX ADMIN — ONDANSETRON 4 MG: 2 INJECTION, SOLUTION INTRAMUSCULAR; INTRAVENOUS at 09:11

## 2024-11-18 RX ADMIN — HYDROMORPHONE HYDROCHLORIDE 0.4 MG: 2 INJECTION INTRAMUSCULAR; INTRAVENOUS; SUBCUTANEOUS at 10:11

## 2024-11-18 RX ADMIN — PROPOFOL 170 MG: 10 INJECTION, EMULSION INTRAVENOUS at 07:11

## 2024-11-18 RX ADMIN — SUGAMMADEX 200 MG: 100 INJECTION, SOLUTION INTRAVENOUS at 09:11

## 2024-11-18 RX ADMIN — FENTANYL CITRATE 100 MCG: 50 INJECTION, SOLUTION INTRAMUSCULAR; INTRAVENOUS at 07:11

## 2024-11-18 RX ADMIN — KETAMINE HYDROCHLORIDE 10 MG: 100 INJECTION, SOLUTION, CONCENTRATE INTRAMUSCULAR; INTRAVENOUS at 08:11

## 2024-11-18 RX ADMIN — MUPIROCIN 1 G: 20 OINTMENT TOPICAL at 06:11

## 2024-11-18 RX ADMIN — ROCURONIUM BROMIDE 20 MG: 10 INJECTION INTRAVENOUS at 09:11

## 2024-11-18 RX ADMIN — SODIUM CHLORIDE: 0.9 INJECTION, SOLUTION INTRAVENOUS at 07:11

## 2024-11-18 NOTE — DISCHARGE INSTRUCTIONS
"If you have any questions about this form, please call [phone] or send us a message on [employer].    Activity Restrictions:  [x]  Return to work will be determined on an individual basis.  [x]  No lifting greater than 10 pounds.  [x]  Avoid bending and twisting the area of your surgery. Do not "test" your range of motion  [x]  No driving or operating machinery:  [x]  until cleared by your surgeon.  [x]  while taking narcotic pain medications or muscle relaxants.    [x]  Wear brace/collar at all times except when lying flat in bed     [x]  Increase ambulation over the next 2 weeks so that you are walking 2 miles per day at 2 weeks post-operatively.  [x]  Make sure to take two dedicated 5-10 minute walks per day, along with short walks every 1-2 hours, even if just to walk to the restroom and back.   [x]  Walk on paved surfaces only. It is okay to walk up and down stairs while holding onto a side rail.  [x]  No sexual activity for 2-3 weeks.    Discharge Medication/Follow-up:  [x]  Please refer to discharge medication reconciliation form.  [x]  For the first week after surgery: Check your blood pressure before taking any blood pressure medications at home. If BP is below 120/80, do not take your blood pressure medication.   [x]  Do not take ANY non-steroidal anti-inflammatory drugs (NSAIDS), including the following: ibuprofen, naprosyn, Aleve, Advil, Indocin, Mobic, or Celebrex for:  [x]  12 weeks  [x]  Prescriptions for appropriate medication will be given upon discharge.   [x]  Pain control: Norco as needed for severe pain only. Over the counter tylenol for mild pain   [x]  Muscle relaxer: Flexeril as needed for muscle spasms. *use home Rx; no refills provided today. Can take every 8 hours if needed, but most helpful at bedtime.  [x]  Nausea: Zofran as needed for nausea     Bowel Regimen:  [x]  Take docusate (Colace 100 mg) and miralax daily until bowel activity returns to aranza. You can get this over the " counter.  [x]  If you have not had a bowel movement by day 3 after surgery, try a fleet's enema or suppository, both over the counter. Call neurosurgery if you have not had a bowel movement by day 5 after surgery.   [x]  Follow-up appointment:  [x]  2 weeks post-op for wound check by PA/nurse  [x]  4-6 weeks with MD    Wound Care:  [x]  Remove dressing or bandaid in  3  days.  [x]  No bandage required once removed. Keep your incision open to the air.  [x]  You may shower on the 3rd day after your surgery. Have the force of water hit you opposite from the incision. Pat the incision dry after your shower; do not scrub the incision. Do not use Hibiclens after surgery.  [x]  You wound is closed with one of the following: steri strips. Allow steri strips to fall off on their own over time  [x]  You cannot take a bath until 8 weeks after surgery.    Call your doctor or go to the Emergency Room for any signs of infection, including: increased redness, drainage, pain, or fever (temperature >=101.5 for 24 hours). Call your doctor or go to the Emergency Room if there are any localized neurological changes; problems with speech, vision, numbness, tingling, weakness, or severe headache; or for other concerns.    Special Instructions:  [x]  No use of tobacco products.  [x]  Diet: Please eat a soft/regular diet as tolerated.  []  Other diet:              Specific physician instructions:           Physicians need 3 days' notice for pain medicine to be refilled. Pain medicine will only be refilled between 8 AM and 5 PM, Monday through Friday, due to Food and Drug Administration regulation of documentation.            Fall Prevention  Millions of people fall every year and injure themselves. You may have had anesthesia or sedation which may increase your risk of falling. You may have health issues that put you at an increased risk of falling.     Here are ways to reduce your risk of falling.    Make your home safe by keeping  walkways clear of objects you may trip over.  Use non-slip pads under rugs. Do not use area rugs or small throw rugs.  Use non-slip mats in bathtubs and showers.  Install handrails and lights on staircases.  Do not walk in poorly lit areas.  Do not stand on chairs or wobbly ladders.  Use caution when reaching overhead or looking upward. This position can cause a loss of balance.  Be sure your shoes fit properly, have non-slip bottoms and are in good condition.   Wear shoes both inside and out. Avoid going barefoot or wearing slippers.  Be cautious when going up and down stairs, curbs, and when walking on uneven sidewalks.  If your balance is poor, consider using a cane or walker.  If your fall was related to alcohol use, stop or limit alcohol intake.   If your fall was related to use of sleeping medicines, talk to your doctor about this. You may need to reduce your dosage at bedtime if you awaken during the night to go to the bathroom.    To reduce the need for nighttime bathroom trips:  Avoid drinking fluids for several hours before going to bed  Empty your bladder before going to bed  Men can keep a urinal at the bedside  Stay as active as you can. Balance, flexibility, strength, and endurance all come from exercise. They all play a role in preventing falls. Ask your healthcare provider which types of activity are right for you.  Get your vision checked on a regular basis.  If you have pets, know where they are before you stand up or walk so you don't trip over them.  Use night lights.

## 2024-11-18 NOTE — PROGRESS NOTES
Certification of Assistant at Surgery       Surgery Date: 11/18/2024     Participating Surgeons:  Surgeons and Role:     * Ben Pandey MD - Primary       Araseli Benjamin PA-C - Assisting       Procedures:  Procedure(s) (LRB):  ACDF C6-C7 (N/A)    Assistant Surgeon's Certification of Necessity:  I understand that section 1842 (b) (6) (d) of the Social Security Act generally prohibits Medicare Part B reasonable charge payment for the services of assistants at surgery in teaching hospitals when qualified residents are available to furnish such services. I certify that the services for which payment is claimed were medically necessary, and that no qualified resident was available to perform the services. I further understand that these services are subject to post-payment review by the Medicare carrier.      Araseli Benjamin PA-C    11/18/2024  10:38 AM

## 2024-11-18 NOTE — ANESTHESIA PROCEDURE NOTES
Arterial    Diagnosis: Cervical Radiculopathy    Patient location during procedure: pre-op  Timeout: 11/18/2024 6:55 AM  Procedure end time: 11/18/2024 7:02 AM    Staffing  Authorizing Provider: See Choi MD  Performing Provider: See Choi MD    Staffing  Performed by: See Choi MD  Authorized by: See Choi MD    Anesthesiologist was present at the time of the procedure.    Preanesthetic Checklist  Completed: patient identified, IV checked, site marked, risks and benefits discussed, surgical consent, monitors and equipment checked, pre-op evaluation, timeout performed and anesthesia consent givenArterial  Skin Prep: chlorhexidine gluconate  Local Infiltration: lidocaine  Orientation: left  Location: radial    Catheter Size: 20 G  Catheter placement by Ultrasound guidance. Heme positive aspiration all ports.   Vessel Caliber: medium, patent, compressibility normal  Vascular Doppler:  not done  Needle advanced into vessel with real time Ultrasound guidance.  Guidewire confirmed in vessel.  Sterile sheath used.  Image recorded and saved.Insertion Attempts: 1  Assessment  Dressing: secured with tape and tegaderm  Patient: Tolerated well

## 2024-11-18 NOTE — ANESTHESIA PROCEDURE NOTES
Intubation    Date/Time: 11/18/2024 7:24 AM    Performed by: Guzman Workman CRNA  Authorized by: See Choi MD    Intubation:     Induction:  Intravenous    Intubated:  Postinduction    Mask Ventilation:  Easy with oral airway    Attempts:  1    Attempted By:  CRNA    Method of Intubation:  Video laryngoscopy    Blade:  Howard 3    Laryngeal View Grade: Grade I - full view of cords      Difficult Airway Encountered?: No      Complications:  None    Airway Device:  Oral endotracheal tube    Airway Device Size:  7.0    Style/Cuff Inflation:  Cuffed (inflated to minimal occlusive pressure)    Tube secured:  22    Secured at:  The lips    Placement Verified By:  Capnometry and Revisualization with laryngoscopy    Complicating Factors:  None    Findings Post-Intubation:  BS equal bilateral and atraumatic/condition of teeth unchanged

## 2024-11-18 NOTE — H&P
History of Present Illness:  Anna Suarez is a 53 y.o. female with chronic cervical radiculopathy who presents with cervical radiculopathy. Symptoms began at least two years ago and has responded well to interventional pain mgmt procedures in the past, though with decreasing efficacy. She denies neck pain. Pain starts in her right arm and travels down to her 1st 3 fingers, but occasionally includes all 5 fingers. She notes numbness in her right hand as well. Pain can be at any time of the day or night, but frequently disrupts sleep. She can stand up at night and let her right arm hang limp to get some relief.      Denies gait disturbance and fine motor dysfunction.      Interval History 8/29/24: patient returns after imaging to discuss findings and consider surgery. She continues to have the same symptoms but now has noticed that she gets an occasional pain in her palmar aspect of right hand. Her symptoms are mostly numbness of fingers 1-3 on her right hand. A few times in the day it worsens and feels like burning nerve pain to her. This tends to be worse at night and interrupts her sleep. She is on a combination of medicines to help her sleep at night. She is able to work but it bothers her at work quite a bit. Her sleep is her biggest problem due to the pain which is affecting all areas of her life. She has tried multiple injections and they are becoming less effective. PT did not help her. Of note she has had carpal tunnel surgeries on both sides, the one on the right done back in 2001. She reports she had an EMG back in 2022 in [place] that supposedly said she has some return of carpal tunnel on the right.      Treatments tried:  -AVERY: reports 4 prior procedures, three of which sound like cervical AVERY. Most recent is described as an injection with a lateral approach (facet injection, MBB?)  -Gabapentin: 300mg TID  -Muscle relaxer: flexeril 10  -Rx pain medications: tramadol and mobic     -Spine surgery: never    "  Blood thinners: none     (Not in a hospital admission)        Review of patient's allergies indicates:  No Known Allergies          Past Medical History:   Diagnosis Date    Anxiety      Depression      PTSD (post-traumatic stress disorder)              Past Surgical History:   Procedure Laterality Date    CARPAL TUNNEL RELEASE Right 2001    CARPAL TUNNEL RELEASE Right 2023    CERVICAL BIOPSY  W/ LOOP ELECTRODE EXCISION N/A      CHOLECYSTECTOMY N/A 2003    e sure implant Left 2012    REMOVAL OF INTRAUTERINE DEVICE (IUD) N/A 2019    UTERINE FIBROID EMBOLIZATION N/A       2008             Family History   Problem Relation Name Age of Onset    Diabetes Mellitus Mother        Breast cancer Mother        Hypertension Father        Alzheimer's disease Father        Diabetes type II Sister          Social History   Social History            Tobacco Use    Smoking status: Never    Smokeless tobacco: Never   Substance Use Topics    Alcohol use: Not Currently       Alcohol/week: 4.0 standard drinks of alcohol       Types: 4 Shots of liquor per week    Drug use: Not Currently            Review of Systems:  As noted in HPI     OBJECTIVE:      Vital Signs (Most Recent):      Vitals:     08/29/24 0846   BP: 132/71   Pulse: 91   Resp: 20   Temp: 99.1 °F (37.3 °C)   TempSrc: Oral   SpO2: 98%   Weight: 84.1 kg (185 lb 6.5 oz)   Height: 5' 1" (1.549 m)   PainSc:   4   PainLoc: Hand            Physical Exam:  General: well developed, well nourished, no distress  Head: normocephalic, atraumatic  Neurologic: Alert and oriented. Thought content appropriate  GCS: Motor: 6/Verbal: 5/Eyes: 4 GCS Total: 15  Language: No aphasia  Speech: No dysarthria  Cranial nerves: face symmetric, tongue midline, CN II-XII grossly intact.   Eyes: pupils equal, round, reactive to light with accommodation, EOMI.   Pulmonary: normal respirations, not labored, no accessory muscles used     Sensory: intact to light touch throughout; decreased sensation in 2nd " and 3rd finger on the right.   Positive darlene and tinnel sign     Motor Strength: Moves all extremities spontaneously with good tone.  Full strength upper and lower extremities. No abnormal movements seen.      Strength   Deltoids Triceps Biceps Wrist Extension Wrist Flexion Hand  FA   Upper: R 5/5 5/5 5/5 5/5 5/5 5/5 5/5     L 5/5 5/5 5/5 5/5 5/5 5/5 5/5       Iliopsoas               Lower: R 5/5                 L 5/5                  [place]: present on left   Clonus: absent     Gait: normal                     Diagnostic Results:  I have personally reviewed imaging and agree with the findings.      MRI cervical spine, March 2024:  C2-3: Moderate left foraminal stenosis   C3-4:  Mild-to-moderate left > right foraminal stenosis   C4-5: Moderate right foraminal stenosis   C5-6: right foramen is minimally narrowed  C6-7: Severe left > right foraminal stenosis     CT C spine 8/7/24 showed mild calcification of disc at C6/C7 with bony osteophytes posteriorly. Some loss of cervical lordosis higher up in neck.  She has right-sided uncovertebral hypertrophy which is most pronounced at C4-5 and C6-7     Xray flex ex C spine 8/7/24: no dynamic instability     ASSESSMENT/PLAN:      Anna Suarez is a 53 y.o. female who presents with right cervical radiculopathy; I believe she is most symptomatic from disc degeneration with foraminal stenosis at C6-7  - continue current medication regiment for pain  - her pain is consistent with cervical radiculopathy, especially given the improvement she had with injections in the past. She may have a component of carpal tunnel that is contributing as well though.  We discussed at some point she could need revision carpal tunnel release in the right  - patient is wanting to proceed with surgery, but is not quite ready to schedule yet  - we did discuss the risks benefits indications alternatives of C6-7 ACDF  - patient is going to be in touch when she is ready to put something on the  schedule

## 2024-11-18 NOTE — TRANSFER OF CARE
Anesthesia Transfer of Care Note    Patient: Anna Suarez    Procedure(s) Performed: Procedure(s) (LRB):  ACDF C6-C7 (N/A)    Patient location: PACU    Anesthesia Type: general    Transport from OR: Transported from OR on 6-10 L/min O2 by face mask with adequate spontaneous ventilation    Post pain: adequate analgesia    Post assessment: no apparent anesthetic complications and tolerated procedure well    Post vital signs: stable    Level of consciousness: responds to stimulation and lethargic    Nausea/Vomiting: no nausea/vomiting    Complications: none    Transfer of care protocol was followed      Last vitals: Visit Vitals  BP (!) 156/83   Pulse 88   Temp 36.7 °C (98.1 °F)   Resp (!) 23   Wt 85.7 kg (189 lb)   SpO2 100%   Breastfeeding No   BMI 35.71 kg/m²

## 2024-11-18 NOTE — BRIEF OP NOTE
[facility]  Surgery Department  Operative Note    SUMMARY     Date of Procedure: 11/18/2024     Procedure: Procedure(s) (LRB):  ACDF C6-C7 (N/A)     Surgeons and Role:     * Ben Pandey MD - Primary    Assisting Surgeon: None    Pre-Operative Diagnosis: Cervical radiculopathy [M54.12]    Post-Operative Diagnosis: Post-Op Diagnosis Codes:     * Cervical radiculopathy [M54.12]    Anesthesia: General    Operative Findings (including complications, if any): good decompression of spinal cord and C7 roots    Description of Technical Procedures: see op note    Estimated Blood Loss (EBL): 30cc           Implants:   Implant Name Type Inv. Item Serial No.  Lot No. LRB No. Used Action   [id number]   [id number] [employer]   1 Implanted   SPACER ACIS PROTI 360 MED 6MM - [id number]  SPACER ACIS PROTI 360 MED 6MM  [employer]  N/A 1 Implanted   10MM PLATE      N/A 1 Implanted   14MM STVA SCREW      N/A 2 Implanted   14MM STFA SCREW      N/A 2 Implanted       Specimens:   Specimen (24h ago, onward)      None                    Condition: Good    Disposition: PACU - hemodynamically stable.    Attestation: Op Note Attestation: I was physically present and scrubbed for the entire procedure.

## 2024-11-19 NOTE — OP NOTE
[facility] Surgery  Neurosurgery  Operative Note    OP Note      Date of Procedure: 11/18/2024     Pre-Operative Diagnosis: Cervical radiculopathy [M54.12]    Post-Operative Diagnosis: Post-Op Diagnosis Codes:     * Cervical radiculopathy [M54.12]    Anesthesia: General    Procedures performed:  1) anterior exposure of spine C6-C7  2) C6-C7 anterior cervical diskectomy with foraminotomies  3) C6-C7  placement of intervertebral biomechanical device   4) C6-C7  fusion using allograft demineralized bone matrix (Xtant DBM)  5) anterior cervical plating C6-C7  6) Use of fluoroscopy  7) Use of operating microscope  All hardware Depuy       Surgeon: Ben Pandey MD    Assistant: Araseli Benjamin PA-C  A qualified resident was not available to assist with this procedure.    Indication for Procedure:   Patient is a 54-year-old female who had a history of greater than 1 year of right-sided radiculopathy consistent with the C7 dermatome.  She had undergone physical therapy, medical management, and numerous injections for this.  She eventually decided that she wanted a definitive solution so anterior cervical diskectomy and fusion was discussed.  Signed informed consent was documented.    Operative report:  Patient was identified in preoperative holding using 2 independent patient identifiers.  Patient was taken to the operating room where general endotracheal anesthesia was induced without any problems.  Patient was placed on a horseshoe headrest in gentle extension.  All pressure points were padded appropriately and the arms were secured.  A neck halter was placed with 7 lb of in-line cervical traction.  An interscapular roll was placed.  The shoulders were taped down.  The skin was cleansed on the anterior aspect of the neck using rubbing alcohol.  The fluoro unit was brought in and we marked an incision which spanned the anterior border of the sternocleidomastoid muscle centered on the C6/7 disc space.  Local  anesthetic was injected subcutaneously.  Patient was then prepped and draped in the usual manner.  A time-out was held identifying the correct patient side site and procedures to be performed.  All parties agreed and imaging was displayed.      Skin was opened with a 10 Blade knife down to the dermis.  This was taken down to the platysma.  This was undermined and opened with the Bovie.  The subplatysmal adhesions were then freed up widely.  A retractor was placed.  We used a combination of sharp and blunt dissection to identify and develop the plane medial to the sternocleidomastoid muscle.    The anterior cervical fascia was identified and opened with Metzenbaum scissors.  We then introduced Cloward retractors and exposed the anterior aspect of the spine at C6 and C7.  Lateral fluoroscopy was brought in and we identified the C6/7disc space.  A fadia was made here with the Bovie under fluoro.  We then used the Bovie to elevate the longus colli muscles C6/7. The Trimline retractor system was then placed centered on the C6/7 level.  Another x-ray was taken to confirm that we were at the correct level.  We then brought the operative microscope in for better visualization and the use of microsurgical techniques.     We then exposed out to the uncovertebral joints bilaterally at C6/7.  The high-speed drill was used to finish flattening out the anterior osteophytes. The disc space was incised.  We used a combination of straight and upgoing curettes as well as the high-speed drill to perform a total diskectomy at C6/7.  We drilled out to the uncovertebral joints bilaterally until we had a very wide foraminal decompression.  A posterior osteophyte was identified and was taken back to the posterior longitudinal ligament using the high-speed drill.  We then elevated the PLL with a blunt hook and resected it using a combination of 1. And 2. Kerrison rongeurs.  We took this out until the nerve roots were decompressed bilaterally  and were felt to be free of any further compression.  We obtained hemostasis in the disc space with Gelfoam soaked in thrombin as well as Surgiflo.  We then used our trials under fluoroscopy to identify the correct size implant.  A 6 mm tall standard footprint ACIS implant was then filled with demineralized bone matrix and placed under lateral fluoroscopy at C6/7.        The microscope was now removed from the field.  We decided on a 10 mm plate which we laid over the anterior aspect of the spine from C6-C7.   holes were drilled into the C6 vertebral body.  We used lateral fluoroscopy to approximate our screw length.  Two 14 mm variable angle screws were placed into the C6 vertebral body.   holes were now drilled into the C7 vertebral body. Two 14 mm fixed angle screws were placed into the C7 vertebral body.  Final lateral and AP x-rays were taken and showed the hardware to be in appropriate position.  Locking mechanisms on the plate were then engaged.     We now removed our Trimline retractor system.  We irrigated copiously with antibiotic infused irrigation.  We reintroduced Cloward retractors and obtained hemostasis along the longus colli with bipolar electrocautery, as well as along the superficial soft tissues using bipolar electrocautery.  The wound was then closed in layers including 2-0 Vicryl sutures for the platysma, inverted interrupted 3-0 Vicryl sutures for the subcuticular layer.  The wound was then cleansed and dried and a sterile dressing was applied consisting of Mastisol, Steri-Strips, Telfa, Tegaderm.  The patient was taken to recovery in stable condition.  All sponge and needle counts were correct x2 at the end of the case.  There were no complications.  I was scrubbed and present for the entire case.     EBL:30cc  Specimen Sent: None    Ben Pandey  Neurosurgery

## 2024-11-19 NOTE — DISCHARGE SUMMARY
[facility] [place] - Surgery  Neurosurgery  Discharge Summary      Patient Name: Anna Suarez  MRN: [id number]  Admission Date: 11/18/2024  Hospital Length of Stay: 0 days  Discharge Date and Time: 11/18/2024  2:31 PM  Attending Physician: Ben Pandey MD   Discharging Provider: Araseli Benjamin PA-C  Primary Care Provider: [facility] Primary Doctor     HPI:   Anna Suarez is a 53 y.o. female with chronic cervical radiculopathy who presents with cervical radiculopathy. Symptoms began at least two years ago and has responded well to interventional pain mgmt procedures in the past, though with decreasing efficacy. She denies neck pain. Pain starts in her right arm and travels down to her 1st 3 fingers, but occasionally includes all 5 fingers. She notes numbness in her right hand as well. Pain can be at any time of the day or night, but frequently disrupts sleep. She can stand up at night and let her right arm hang limp to get some relief.      Denies gait disturbance and fine motor dysfunction.      Interval History 8/29/24: patient returns after imaging to discuss findings and consider surgery. She continues to have the same symptoms but now has noticed that she gets an occasional pain in her palmar aspect of right hand. Her symptoms are mostly numbness of fingers 1-3 on her right hand. A few times in the day it worsens and feels like burning nerve pain to her. This tends to be worse at night and interrupts her sleep. She is on a combination of medicines to help her sleep at night. She is able to work but it bothers her at work quite a bit. Her sleep is her biggest problem due to the pain which is affecting all areas of her life. She has tried multiple injections and they are becoming less effective. PT did not help her. Of note she has had carpal tunnel surgeries on both sides, the one on the right done back in 2001. She reports she had an EMG back in 2022 in [place] that supposedly said she has some return of carpal  tunnel on the right.     Procedure(s) (LRB):  ACDF C6-C7 (N/A)     Hospital Course:   11/18: C6-7 ACDF with Dr. Pandey. Tolerated procedure well. [place] j collar applied post-operatively. No surgical drain or horan catheter. Moving all extremities in pacu. Discharge instructions reviewed with patient. All questions answered. Prescriptions sent to hospital pharmacy for bedside delivery. Discharged home.     Pending Diagnostic Studies:       None          Final Active Diagnoses:    Diagnosis Date Noted POA    PRINCIPAL PROBLEM:  S/P cervical spinal fusion [Z98.1] 11/18/2024 Not Applicable      Problems Resolved During this Admission:      Discharged Condition: stable    Disposition: Home or Self Care    Follow Up:   Follow-up Information       Araseli Benjamin PA-C Follow up on 12/3/2024.    Specialty: Neurosurgery  Why: For wound re-check at 1:40pm  Contact information:  [address]  [address] [place] [id number]  [phone]                           Patient Instructions:      Diet Adult Regular     Lifting restrictions     Other restrictions (specify):     No driving until:      Remove dressing in 72 hours     Notify your health care provider if you experience any of the following:  temperature >100.4     Notify your health care provider if you experience any of the following:  persistent nausea and vomiting or diarrhea     Notify your health care provider if you experience any of the following:  severe uncontrolled pain     Notify your health care provider if you experience any of the following:  redness, tenderness, or signs of infection (pain, swelling, redness, odor or green/yellow discharge around incision site)     Notify your health care provider if you experience any of the following:  difficulty breathing or increased cough     Notify your health care provider if you experience any of the following:  severe persistent headache     Notify your health care provider if you experience any of the  following:  worsening rash     Notify your health care provider if you experience any of the following:  persistent dizziness, light-headedness, or visual disturbances     Notify your health care provider if you experience any of the following:  increased confusion or weakness     Shower on day dressing removed (No bath)     Medications:  Reconciled Home Medications:      Medication List        START taking these medications      HYDROcodone-acetaminophen  mg per tablet  Commonly known as: NORCO  Take 1 tablet by mouth every 4 (four) hours as needed for Pain (7-10/10 pain).     ondansetron 4 MG Tbdl  Commonly known as: ZOFRAN-ODT  Dissolve 1 tablet (4 mg total) by mouth every 6 (six) hours as needed (nausea).            CONTINUE taking these medications      acetaminophen 325 MG tablet  Commonly known as: TYLENOL  Take 325 mg by mouth. Four tablets at nighttime as needed for pain     AUVELITY  mg Tbie  Generic drug: dextromethorphan-bupropion  Take 1 tablet by mouth 2 (two) times a day.     cloNIDine 0.1 MG tablet  Commonly known as: CATAPRES  Take 0.1 mg by mouth every evening.     cyclobenzaprine 10 MG tablet  Commonly known as: FLEXERIL  Take 1 tablet (10 mg total) by mouth as needed for Muscle spasms (nightly).     gabapentin 300 MG capsule  Commonly known as: NEURONTIN  Take 900 mg by mouth every evening. As needed     METAMUCIL ORAL  Take by mouth. As needed for constipation     PANTOPRAZOLE ORAL  Take by mouth. As needed for reflux     traZODone 100 MG tablet  Commonly known as: DESYREL  Take 100 mg by mouth every evening.     zolpidem 10 mg Tab  Commonly known as: AMBIEN  Take 10 mg by mouth nightly as needed.            STOP taking these medications      ibuprofen 200 MG tablet  Commonly known as: WAN BAILON PA-C  Neurosurgery  [facility] - Surgery

## 2024-11-22 ENCOUNTER — PATIENT MESSAGE (OUTPATIENT)
Dept: NEUROSURGERY | Facility: CLINIC | Age: 54
End: 2024-11-22
Payer: COMMERCIAL

## 2024-11-22 RX ORDER — METHYLPREDNISOLONE 4 MG/1
TABLET ORAL
Qty: 21 EACH | Refills: 0 | Status: SHIPPED | OUTPATIENT
Start: 2024-11-22 | End: 2024-12-13

## 2024-12-02 ENCOUNTER — TELEPHONE (OUTPATIENT)
Dept: NEUROSURGERY | Facility: CLINIC | Age: 54
End: 2024-12-02
Payer: COMMERCIAL

## 2024-12-03 ENCOUNTER — OFFICE VISIT (OUTPATIENT)
Dept: NEUROSURGERY | Facility: CLINIC | Age: 54
End: 2024-12-03
Payer: COMMERCIAL

## 2024-12-03 VITALS
SYSTOLIC BLOOD PRESSURE: 130 MMHG | HEIGHT: 61 IN | HEART RATE: 75 BPM | TEMPERATURE: 99 F | WEIGHT: 188.94 LBS | OXYGEN SATURATION: 95 % | BODY MASS INDEX: 35.67 KG/M2 | DIASTOLIC BLOOD PRESSURE: 72 MMHG

## 2024-12-03 DIAGNOSIS — Z98.1 S/P CERVICAL SPINAL FUSION: Primary | ICD-10-CM

## 2024-12-03 PROCEDURE — 1159F MED LIST DOCD IN RCRD: CPT | Mod: CPTII,S$GLB,, | Performed by: PHYSICIAN ASSISTANT

## 2024-12-03 PROCEDURE — 3075F SYST BP GE 130 - 139MM HG: CPT | Mod: CPTII,S$GLB,, | Performed by: PHYSICIAN ASSISTANT

## 2024-12-03 PROCEDURE — 1160F RVW MEDS BY RX/DR IN RCRD: CPT | Mod: CPTII,S$GLB,, | Performed by: PHYSICIAN ASSISTANT

## 2024-12-03 PROCEDURE — 99024 POSTOP FOLLOW-UP VISIT: CPT | Mod: S$GLB,,, | Performed by: PHYSICIAN ASSISTANT

## 2024-12-03 PROCEDURE — 3044F HG A1C LEVEL LT 7.0%: CPT | Mod: CPTII,S$GLB,, | Performed by: PHYSICIAN ASSISTANT

## 2024-12-03 PROCEDURE — 3078F DIAST BP <80 MM HG: CPT | Mod: CPTII,S$GLB,, | Performed by: PHYSICIAN ASSISTANT

## 2024-12-03 NOTE — PROGRESS NOTES
Wound Check   Neurosurgery     Anna Suarez is a 54 y.o. female who presents to clinic today for 2 week wound check, s/p C6-7 ACDF with Dr. Pandey.  Denies fevers, chills, night sweats or N/V. Further denies wound drainage or swelling. Pt has been taking Norco in the as night as needed for pain.  Overall she seems very happy with the surgery.  All radicular arm pain is fully resolved.  She has some residual numbness in the distal 1st through 4th fingers on the right hand.  Denies fever.  Having regular bowel movements.  Denies swallowing difficulties.      Shortly after surgery, she did have redness and swelling around her incision that was suspicious for an allergic reaction to the adhesive dressing.  Medrol Dosepak was provided and symptoms quickly improved.      Physical Exam:   General: well developed, well nourished, no distress  Neurologic: Alert and oriented. Thought content appropriate.   GCS: Motor: 6/Verbal: 5/Eyes: 4 GCS Total: 15   Mental Status: Awake, Alert, Oriented x3   Cranial nerves: face symmetric, tongue midline, pupils equal, round, reactive to light with accomodation, EOMI.   Motor Strength: moves all extremities with good strength and tone 5/5 b/l upper extremity  Sensation: response to light touch throughout; decreased in fingertips of right 1st through 4th fingers  No gait disturbances   [place] J collar worn properly    Incision is clean, dry and intact with no signs of erythema, swelling or purulent drainage.  Steri-Strips are intact on exam and removed in clinic. All skin edges are completely approximated.       Vitals:    12/03/24 1319   BP: 130/72   Pulse: 75   Temp: 98.7 °F (37.1 °C)             Assessment/Plan:   Anna Suarez is a 54 y.o. female who presents for 2 week wound check, s/p C6-7 ACDF with Dr. Pandey.  Doing very well postoperatively.    -Keep incision open to air   -Avoid anti-inflammatories for anther 6 weeks. These include ibuprofen, excedrin, mobic, celebrex, aleve,  naproxen, etc. Tylenol may be taken for mild-moderate pain.   -OK to resume home blood thinner, if applicable.   -cotninue Cervical collar  -Call if you need a refill of pain medication  -Can shower and get incision wet, just pat dry and no vigorous scrubbing. Do not submerge incision for another 4 weeks.   -No lifting more than 10 lbs or excessive bending/twisting.   -Can drive after 4 weeks when no longer taking narcotics   -Follow up with Dr. Pandey in 2 weeks with new xrays  -Encouraged patient to call if they have any questions or concerns prior to next follow up appt        Araseli Benjamin PA-C  [facility]  Department of Neurosurgery  [phone]

## 2024-12-03 NOTE — PATIENT INSTRUCTIONS
-Keep incision open to air   -Avoid anti-inflammatories for anther 6 weeks. These include ibuprofen, excedrin, mobic, celebrex, aleve, naproxen, etc. Tylenol may be taken for mild-moderate pain.   -OK to resume home blood thinner, if applicable.   -cotninue Cervical collar  -Call if you need a refill of pain medication  -Can shower and get incision wet, just pat dry and no vigorous scrubbing. Do not submerge incision for another 4 weeks.   -No lifting more than 10 lbs or excessive bending/twisting.   -Can drive after 4 weeks when no longer taking narcotics   -Follow up with Dr. Pandey in 2 weeks with new xrays  -Please call with any questions or concerns prior to your next appointment.

## 2024-12-16 ENCOUNTER — TELEPHONE (OUTPATIENT)
Dept: NEUROSURGERY | Facility: CLINIC | Age: 54
End: 2024-12-16
Payer: COMMERCIAL

## 2024-12-17 ENCOUNTER — HOSPITAL ENCOUNTER (OUTPATIENT)
Dept: RADIOLOGY | Facility: HOSPITAL | Age: 54
Discharge: HOME | End: 2024-12-17
Attending: PHYSICIAN ASSISTANT
Payer: COMMERCIAL

## 2024-12-17 ENCOUNTER — OFFICE VISIT (OUTPATIENT)
Dept: NEUROSURGERY | Facility: CLINIC | Age: 54
End: 2024-12-17
Attending: PHYSICIAN ASSISTANT
Payer: COMMERCIAL

## 2024-12-17 VITALS
SYSTOLIC BLOOD PRESSURE: 144 MMHG | TEMPERATURE: 99 F | BODY MASS INDEX: 37 KG/M2 | HEIGHT: 61 IN | OXYGEN SATURATION: 97 % | DIASTOLIC BLOOD PRESSURE: 77 MMHG | WEIGHT: 196 LBS | HEART RATE: 75 BPM

## 2024-12-17 DIAGNOSIS — Z98.1 S/P CERVICAL SPINAL FUSION: ICD-10-CM

## 2024-12-17 PROCEDURE — 3078F DIAST BP <80 MM HG: CPT | Mod: CPTII,S$GLB,, | Performed by: STUDENT IN AN ORGANIZED HEALTH CARE EDUCATION/TRAINING PROGRAM

## 2024-12-17 PROCEDURE — 72040 X-RAY EXAM NECK SPINE 2-3 VW: CPT | Mod: 26,,, | Performed by: RADIOLOGY

## 2024-12-17 PROCEDURE — 3077F SYST BP >= 140 MM HG: CPT | Mod: CPTII,S$GLB,, | Performed by: STUDENT IN AN ORGANIZED HEALTH CARE EDUCATION/TRAINING PROGRAM

## 2024-12-17 PROCEDURE — 3044F HG A1C LEVEL LT 7.0%: CPT | Mod: CPTII,S$GLB,, | Performed by: STUDENT IN AN ORGANIZED HEALTH CARE EDUCATION/TRAINING PROGRAM

## 2024-12-17 PROCEDURE — 99024 POSTOP FOLLOW-UP VISIT: CPT | Mod: S$GLB,,, | Performed by: STUDENT IN AN ORGANIZED HEALTH CARE EDUCATION/TRAINING PROGRAM

## 2024-12-17 PROCEDURE — 72040 X-RAY EXAM NECK SPINE 2-3 VW: CPT | Mod: TC,FY

## 2024-12-17 PROCEDURE — 1159F MED LIST DOCD IN RCRD: CPT | Mod: CPTII,S$GLB,, | Performed by: STUDENT IN AN ORGANIZED HEALTH CARE EDUCATION/TRAINING PROGRAM

## 2024-12-17 RX ORDER — HYDROCODONE BITARTRATE AND ACETAMINOPHEN 5; 325 MG/1; MG/1
1 TABLET ORAL EVERY 6 HOURS PRN
Qty: 20 TABLET | Refills: 0 | Status: SHIPPED | OUTPATIENT
Start: 2024-12-17

## 2024-12-17 RX ORDER — CYCLOBENZAPRINE HCL 10 MG
10 TABLET ORAL 3 TIMES DAILY PRN
Qty: 90 TABLET | Refills: 0 | Status: SHIPPED | OUTPATIENT
Start: 2024-12-17 | End: 2025-01-16

## 2024-12-18 NOTE — PROGRESS NOTES
[facility]  Neurosurgery    SUBJECTIVE:     Interval history 12/18/2024.    Patient has had complete resolution of her radiculopathy.  She does have interscapular pain as expected.  She states she still has some numbness on the distal finger pads of the right 1st and 3rd digits as well as on the inside aspect of her 4th digit.  She has a history of carpal tunnel syndrome status post carpal tunnel release.  She is doing well in terms of swallowing, walking.  She is wearing her neck brace quite frequently because she does have some worries related to the possibility of messing up her hardware.  She inquired about possible physical therapy.  She will take a pain pill every once in a while, just at night, but has not required any refills.    Postop interval history 12/3/24:  Anna Suarez is a 54 y.o. female who presents to clinic today for 2 week wound check, s/p C6-7 ACDF with Dr. Pandey.  Denies fevers, chills, night sweats or N/V. Further denies wound drainage or swelling. Pt has been taking Norco in the as night as needed for pain.  Overall she seems very happy with the surgery.  All radicular arm pain is fully resolved.  She has some residual numbness in the distal 1st through 4th fingers on the right hand.  Denies fever.  Having regular bowel movements.  Denies swallowing difficulties.      Shortly after surgery, she did have redness and swelling around her incision that was suspicious for an allergic reaction to the adhesive dressing.  Medrol Dosepak was provided and symptoms quickly improved.    History of Present Illness:  Anna Suarez is a 54 y.o. female with chronic cervical radiculopathy who presents with cervical radiculopathy. Symptoms began at least two years ago and has responded well to interventional pain mgmt procedures in the past, though with decreasing efficacy. She denies neck pain. Pain starts in her right arm and travels down to her 1st 3 fingers, but occasionally includes all 5  fingers. She notes numbness in her right hand as well. Pain can be at any time of the day or night, but frequently disrupts sleep. She can stand up at night and let her right arm hang limp to get some relief.     Denies gait disturbance and fine motor dysfunction.     Interval History 8/29/24: patient returns after imaging to discuss findings and consider surgery. She continues to have the same symptoms but now has noticed that she gets an occasional pain in her palmar aspect of right hand. Her symptoms are mostly numbness of fingers 1-3 on her right hand. A few times in the day it worsens and feels like burning nerve pain to her. This tends to be worse at night and interrupts her sleep. She is on a combination of medicines to help her sleep at night. She is able to work but it bothers her at work quite a bit. Her sleep is her biggest problem due to the pain which is affecting all areas of her life. She has tried multiple injections and they are becoming less effective. PT did not help her. Of note she has had carpal tunnel surgeries on both sides, the one on the right done back in 2001. She reports she had an EMG back in 2022 in [place] that supposedly said she has some return of carpal tunnel on the right.     Treatments tried:  -AVERY: reports 4 prior procedures, three of which sound like cervical AVERY. Most recent is described as an injection with a lateral approach (facet injection, MBB?)  -Gabapentin: 300mg TID  -Muscle relaxer: flexeril 10  -Rx pain medications: tramadol and mobic    -Spine surgery: never    Blood thinners: none    (Not in a hospital admission)      Review of patient's allergies indicates:  No Known Allergies    Past Medical History:   Diagnosis Date    Anxiety     Arthritis     Depression     GERD (gastroesophageal reflux disease)     History of pneumonia 05/2024    Obesity, unspecified     PONV (postoperative nausea and vomiting)     PTSD (post-traumatic stress disorder)      Past Surgical  "History:   Procedure Laterality Date    ANTERIOR CERVICAL DISCECTOMY W/ FUSION N/A 11/18/2024    Procedure: ACDF C6-C7;  Surgeon: Ben Pandey MD;  Location: [facility] OR;  Service: Neurosurgery;  Laterality: N/A;  no monitoring. depuy vendor. horseshoe headrest, halter traction, regular bed, fluoro, microscope  Depuy rep notified- [place]---CLEARED BY PCP  RN PREOP 11/4/2024   UPT, T/S DAY OF SURG----NEED CONSENT    CARPAL TUNNEL RELEASE Right 2001    CARPAL TUNNEL RELEASE Left 2023    CERVICAL BIOPSY  W/ LOOP ELECTRODE EXCISION N/A     CHOLECYSTECTOMY N/A 2003    e sure implant Left 2012    REMOVAL OF INTRAUTERINE DEVICE (IUD) N/A 2019    UTERINE FIBROID EMBOLIZATION N/A     2008     Family History   Problem Relation Name Age of Onset    Diabetes Mellitus Mother      Breast cancer Mother      Hypertension Father      Alzheimer's disease Father      Diabetes type II Sister       Social History     Tobacco Use    Smoking status: Former     Types: Cigarettes    Smokeless tobacco: Never    Tobacco comments:     Quit tobacco October 20, 2023   Substance Use Topics    Alcohol use: Yes     Alcohol/week: 4.0 standard drinks of alcohol     Types: 4 Shots of liquor per week    Drug use: Not Currently        Review of Systems:  As noted in HPI    OBJECTIVE:     Vital Signs (Most Recent):  Vitals:    12/17/24 1520   BP: (!) 144/77   Pulse: 75   Temp: 99 °F (37.2 °C)   TempSrc: Oral   SpO2: 97%   Weight: 88.9 kg (195 lb 15.8 oz)   Height: 5' 1" (1.549 m)   PainSc:   4   PainLoc: Shoulder         Physical Exam:  General: well developed, well nourished, no distress.  Right anterior cervical incision appears completely healed.  Head: normocephalic, atraumatic  Neurologic: Alert and oriented. Thought content appropriate  GCS: Motor: 6/Verbal: 5/Eyes: 4 GCS Total: 15  Language: No aphasia  Speech: No dysarthria  Cranial nerves: face symmetric, tongue midline, CN II-XII grossly intact.   Eyes: pupils equal, round, reactive to light with " accommodation, EOMI.   Pulmonary: normal respirations, not labored, no accessory muscles used    Motor Strength: Moves all extremities spontaneously with good tone.  Full strength upper and lower extremities. No abnormal movements seen.     Strength  Deltoids Triceps Biceps Wrist Extension Wrist Flexion Hand  FA   Upper: R 5/5 5/5 5/5 5/5 5/5 5/5 5/5    L 5/5 5/5 5/5 5/5 5/5 5/5 5/5     Iliopsoas         Lower: R 5/5          L 5/5           Gait: normal      Diagnostic Results:  I have personally reviewed imaging and agree with the findings.     X-rays were obtained today which show excellent hardware placement at C6 and C7.  There is improvement in lordosis at the C6-7 segment.    ASSESSMENT/PLAN:     Anna Suarez is a 54 y.o. female one-month status post C6-7 ACDF  -patient doing well  -I will refill her pain medicine at a lower dosage  -I will also provide a new prescription for Flexeril for her interscapular pain.  Encouraged her that this should improve as time goes by and is a very common post ACDF symptom  -patient may need a new EMG of the upper extremities if her numbness symptoms in the hand persist.  I would like to hold off at this time and continue to give things a chance to improve  -counseled patient if she would like to do physical therapy she can let me know and I will place a referral for this  -follow up at three-month fadia with a another set of x-rays    Ben Pandey  Neurosurgery

## 2025-02-18 ENCOUNTER — HOSPITAL ENCOUNTER (OUTPATIENT)
Dept: RADIOLOGY | Facility: HOSPITAL | Age: 55
Discharge: HOME | End: 2025-02-18
Attending: STUDENT IN AN ORGANIZED HEALTH CARE EDUCATION/TRAINING PROGRAM
Payer: COMMERCIAL

## 2025-02-18 DIAGNOSIS — Z98.1 S/P CERVICAL SPINAL FUSION: ICD-10-CM

## 2025-02-18 PROCEDURE — 72040 X-RAY EXAM NECK SPINE 2-3 VW: CPT | Mod: TC

## 2025-02-19 ENCOUNTER — PATIENT MESSAGE (OUTPATIENT)
Dept: NEUROSURGERY | Facility: CLINIC | Age: 55
End: 2025-02-19
Payer: COMMERCIAL

## 2025-02-19 ENCOUNTER — TELEPHONE (OUTPATIENT)
Dept: NEUROSURGERY | Facility: CLINIC | Age: 55
End: 2025-02-19
Payer: COMMERCIAL

## 2025-02-20 ENCOUNTER — OFFICE VISIT (OUTPATIENT)
Dept: NEUROSURGERY | Facility: CLINIC | Age: 55
End: 2025-02-20
Attending: STUDENT IN AN ORGANIZED HEALTH CARE EDUCATION/TRAINING PROGRAM
Payer: COMMERCIAL

## 2025-02-20 DIAGNOSIS — Z86.69 H/O CARPAL TUNNEL SYNDROME: ICD-10-CM

## 2025-02-20 DIAGNOSIS — R20.0 NUMBNESS AND TINGLING IN RIGHT HAND: ICD-10-CM

## 2025-02-20 DIAGNOSIS — Z98.1 ARTHRODESIS STATUS: Primary | ICD-10-CM

## 2025-02-20 DIAGNOSIS — R20.2 NUMBNESS AND TINGLING IN RIGHT HAND: ICD-10-CM

## 2025-02-20 NOTE — PROGRESS NOTES
The patient location is:  Home  The chief complaint leading to consultation is:  3 month postop evaluation    Visit type: audiovisual    Face to Face time with patient: 7 min  10 minutes of total time spent on the encounter, which includes face to face time and non-face to face time preparing to see the patient (eg, review of tests), Obtaining and/or reviewing separately obtained history, Documenting clinical information in the electronic or other health record, Independently interpreting results (not separately reported) and communicating results to the patient/family/caregiver, or Care coordination (not separately reported).         Each patient to whom he or she provides medical services by telemedicine is:  (1) informed of the relationship between the physician and patient and the respective role of any other health care provider with respect to management of the patient; and (2) notified that he or she may decline to receive medical services by telemedicine and may withdraw from such care at any time.    Notes:   [facility]  Neurosurgery    SUBJECTIVE:     HPI 02/20/2025   Patient returns via virtual visit for routine 3 month postop evaluation, status post C6-7 ACDF with Dr. Pandey.  Preop radicular pain remains resolved.  Postop scapular pain is now resolved.  No complaints of neck pain.  Her only complaint is of right hand numbness that is most predominant in the 1st, 2nd, 3rd, and inside of the 4th digits.  She believes this is a return of carpal tunnel.  The wrist will wake her up at night due to pain unless she wears a wrist brace for support.    (Not in a hospital admission)      Review of patient's allergies indicates:  No Known Allergies    Past Medical History:   Diagnosis Date    Anxiety     Arthritis     Depression     GERD (gastroesophageal reflux disease)     History of pneumonia 05/2024    Obesity, unspecified     PONV (postoperative nausea and vomiting)     PTSD (post-traumatic stress  disorder)      Past Surgical History:   Procedure Laterality Date    ANTERIOR CERVICAL DISCECTOMY W/ FUSION N/A 11/18/2024    Procedure: ACDF C6-C7;  Surgeon: Ben Pandey MD;  Location: [facility] OR;  Service: Neurosurgery;  Laterality: N/A;  no monitoring. depuy vendor. horseshoe headrest, halter traction, regular bed, fluoro, microscope  Depuy rep notified- [place]---CLEARED BY PCP  RN PREOP 11/4/2024   UPT, T/S DAY OF SURG----NEED CONSENT    CARPAL TUNNEL RELEASE Right 2001    CARPAL TUNNEL RELEASE Left 2023    CERVICAL BIOPSY  W/ LOOP ELECTRODE EXCISION N/A     CHOLECYSTECTOMY N/A 2003    e sure implant Left 2012    REMOVAL OF INTRAUTERINE DEVICE (IUD) N/A 2019    UTERINE FIBROID EMBOLIZATION N/A     2008     Social History     Tobacco Use    Smoking status: Former     Types: Cigarettes    Smokeless tobacco: Never    Tobacco comments:     Quit tobacco October 20, 2023   Substance Use Topics    Alcohol use: Yes     Alcohol/week: 4.0 standard drinks of alcohol     Types: 4 Shots of liquor per week    Drug use: Not Currently        Review of Systems:  As noted in HPI    OBJECTIVE:     Vital Signs (Most Recent):  There were no vitals filed for this visit.  (virtual visit)      Physical Exam:  General: well developed, well nourished, no distress.  Right anterior cervical incision appears completely healed.  Head: normocephalic, atraumatic  Neurologic: Alert and oriented. Thought content appropriate  GCS: Motor: 6/Verbal: 5/Eyes: 4 GCS Total: 15  Language: No aphasia  Speech: No dysarthria  Cranial nerves: face symmetric, tongue midline, CN II-XII grossly intact.   Eyes: pupils equal, round, reactive to light with accommodation, EOMI.   Pulmonary: normal respirations, not labored, no accessory muscles used    Motor Strength: Moves all extremities spontaneously with good tone.    Gait: normal      Anterior cervical incision is well healed      Diagnostic Results:  I have personally reviewed imaging and agree with the  findings.     Cervical x-ray AP lateral 02/18/2025   Stable hardware placement    December 2024 cervical x-rays show excellent hardware placement at C6 and C7.  There is improvement in lordosis at the C6-7 segment.    ASSESSMENT/PLAN:     Anna Suarez is a 54 y.o. female 3 months status post C6-7 ACDF.  She is doing very well with no active complaints related to her surgery.  Her only concern now is of worsening numbness in her right hand that she attributes to carpal tunnel syndrome.  She did have carpal tunnel surgery on this hand in 2001, and she reports these symptoms are very similar.  She is wearing a wrist brace at night intermittently in notices a significant improvement in pain on the night when she wears it.    Regarding ACDF:   --Follow up in 3 months with CT for routine six-month postop evaluation with Dr. Pandey    Regarding carpal tunnel syndrome:   --EMG ordered.  Will attempt to schedule at [facility] or [facility] per patient preference.  May send to Dr. Isbell on the [facility] if there is no availability on the [facility].    --Carpal tunnel home exercises mailed to patient.  Continue wrist brace nightly    Please call with any questions or concerns prior to your next appointment.         Araseli Benjamin PA-C  [facility]  Department of Neurosurgery  [phone]

## 2025-02-20 NOTE — Clinical Note
Please mail carpal tunnel handout to patient. EMG ordered. She requests [facility] or main [facility] for scheduling. If nothing is available soon, please send to Dr. Isbell. Virtual FU after EMG.   Also, please schedule FU with Dr. Pandey in 3 months with a new CT for her 6 month post-op.  Thanks,  Araseli

## 2025-03-27 ENCOUNTER — TELEPHONE (OUTPATIENT)
Dept: NEUROLOGY | Facility: CLINIC | Age: 55
End: 2025-03-27
Payer: COMMERCIAL

## 2025-03-27 NOTE — TELEPHONE ENCOUNTER
"----- Message from Calista sent at 3/26/2025  8:16 AM CDT -----  Regarding: pt advice  Contact: [phone]  .Name Of Caller: Self Contact Preference?: [phone] What is the nature of the call?: needing to reschedule EMG procedure 4/7/25 and for appt  4/15/25 also to correspondence with EMG date, pls call   Additional Notes:"Thank you for all that you do for our patients"  "

## 2025-05-07 ENCOUNTER — PROCEDURE VISIT (OUTPATIENT)
Dept: NEUROLOGY | Facility: CLINIC | Age: 55
End: 2025-05-07
Payer: COMMERCIAL

## 2025-05-07 DIAGNOSIS — R20.2 NUMBNESS AND TINGLING IN RIGHT HAND: ICD-10-CM

## 2025-05-07 DIAGNOSIS — R20.0 NUMBNESS AND TINGLING IN RIGHT HAND: ICD-10-CM

## 2025-05-07 DIAGNOSIS — Z86.69 H/O CARPAL TUNNEL SYNDROME: ICD-10-CM

## 2025-05-07 PROCEDURE — 95911 NRV CNDJ TEST 9-10 STUDIES: CPT | Mod: S$GLB,,, | Performed by: PHYSICAL MEDICINE & REHABILITATION

## 2025-05-07 PROCEDURE — 95886 MUSC TEST DONE W/N TEST COMP: CPT | Mod: S$GLB,,, | Performed by: PHYSICAL MEDICINE & REHABILITATION

## 2025-05-07 NOTE — PROCEDURES
Test Date:  2025    Patient:  mike cartagena : 1970 Physician: Jhoan Mccarty D.O.   ID#: [id number] SEX: Female Ref. Phys: Araseli Benjamin, *     HPI: Anna Cartagena is a 54 y.o.female who presents for NCS/EMG to evaluate for recurrent right CTS vs cervical radiculopathy.  Hx of right CTR in  or so (left CTR more recently), and recent ACDF late last year.  Symptoms confined to the right hand currently.      PROCEDURE:  Prior to the procedure, the procedure was discussed in detail with the patient.  All questions were answered, and verbal consent was obtained.  For nerve conduction studies, a combination of surface electrodes, bar electrodes, and/or ring electrodes were used as needed.  For needle EMG, each site was cleaned and prepped in usual fashion with an alcohol pad.  A monopolar needle (28G) was used.  There was no significant bleeding, and bandages were applied as needed.  The procedure was tolerated without adverse effect.  The patient was instructed on post-procedure care including ice if needed for 10-15 minutes up to 4 times/day for any sore muscles.  I discussed with the patient that the data would be reviewed and a report sent to the referring provider, where any follow up questions regarding next steps should be directed.        NCV & EMG Findings:  Evaluation of the right median motor nerve showed prolonged distal onset latency.  The right median sensory nerve showed no response.  All remaining nerves (as indicated in the following tables) were within normal limits.  The right APB showed increased MUAP amplitude.  All remaining examined muscles (as indicated in the following table) showed no evidence of electrical instability.      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 Carpal tunnel syndrome).  There is no motor axonal loss.  There is no active denervation.  This is graded as Severe in  severity on the right.          ___________________________  Jhoan Mccarty D.O.        NCS+  Motor Nerve Results      Latency Amplitude F-Lat Segment Distance CV Comment   Site (ms) Norm (mV) Norm (ms)  (cm) (m/s) Norm    Left Median (APB)   Wrist 3.6  < 4.4 5.5  > 4.2         Elbow 7.0 - 2.7 -  Elbow-Wrist 20 59  > 51    Right Median (APB)   Wrist *7.2  < 4.4 4.9  > 4.2         Elbow 11.0 - 3.9 -  Elbow-Wrist 20 53  > 51    Left Ulnar (ADM)   Wrist 2.2  < 3.7 11.0  > 3.0         Bel Elbow 5.7 - 8.0 -  Bel Elbow-Wrist 23 66  > 52    Right Ulnar (ADM)   Wrist 2.5  < 3.7 10.8  > 3.0         Bel Elbow 5.8 - 10.1 -  Bel Elbow-Wrist 23 70  > 52    Abv Elbow 6.8 - 9.9 -  Abv Elbow-Bel Elbow 8 80  > 43      Sensory Nerve Results      Start Lat Latency (Peak) Amplitude (P-P) Segment Distance Start CV Comment   Site (ms) Norm (ms) (µV) Norm  (cm) (m/s) Norm    Left Median (Rec:Dig II)   Wrist 2.7  < 3.3 3.5 36  > 8 Wrist-Dig II 14 52  > 42    Right Median (Rec:Dig II)   Wrist *NR  < 3.3 *NR *NR  > 8 Wrist-Dig II 14 *NR  > 42    Left Ulnar (Rec:Dig V)   Wrist 1.75  < 3.1 2.3 44  > 4 Wrist-Dig V 14 80  > 45    Right Ulnar (Rec:Dig V)   Wrist 1.78  < 3.1 2.4 43  > 4 Wrist-Dig V 14 79  > 45    Right Radial (Rec:Wrist)   Forearm 0.93  < 2.2 1.53 47  > 11 Forearm-Wrist 10 108 -      EMG+     Side Muscle Nerve Root Ins Act Fibs Psw Amp Dur Poly Recrt Int Pat Comment   Right Deltoid Axillary C5-C6 Nml Nml Nml Nml Nml 0 Nml Nml    Right Biceps Musculocut C5-C6 Nml Nml Nml Nml Nml 0 Nml Nml    Right Pronator Teres Median C6-C7 Nml Nml Nml Nml Nml 0 Nml Nml    Right Triceps Radial C6-C8 Nml Nml Nml Nml Nml 0 Nml Nml    Right APB Median C8-T1 Nml Nml Nml *Inc Nml 0 Nml Nml            Waveforms:    Motor              Sensory

## 2025-05-13 ENCOUNTER — PATIENT MESSAGE (OUTPATIENT)
Dept: NEUROSURGERY | Facility: CLINIC | Age: 55
End: 2025-05-13

## 2025-05-13 ENCOUNTER — OFFICE VISIT (OUTPATIENT)
Dept: NEUROSURGERY | Facility: CLINIC | Age: 55
End: 2025-05-13
Payer: COMMERCIAL

## 2025-05-13 DIAGNOSIS — R20.2 NUMBNESS AND TINGLING IN RIGHT HAND: ICD-10-CM

## 2025-05-13 DIAGNOSIS — R20.0 NUMBNESS AND TINGLING IN RIGHT HAND: ICD-10-CM

## 2025-05-13 DIAGNOSIS — Z98.890 HISTORY OF BILATERAL CARPAL TUNNEL RELEASE: ICD-10-CM

## 2025-05-13 DIAGNOSIS — G56.01 RIGHT CARPAL TUNNEL SYNDROME: Primary | ICD-10-CM

## 2025-05-13 DIAGNOSIS — Z98.1 S/P CERVICAL SPINAL FUSION: ICD-10-CM

## 2025-05-13 PROCEDURE — 1159F MED LIST DOCD IN RCRD: CPT | Mod: CPTII,95,, | Performed by: PHYSICIAN ASSISTANT

## 2025-05-13 PROCEDURE — 1160F RVW MEDS BY RX/DR IN RCRD: CPT | Mod: CPTII,95,, | Performed by: PHYSICIAN ASSISTANT

## 2025-05-13 PROCEDURE — 98004 SYNCH AUDIO-VIDEO EST SF 10: CPT | Mod: 95,,, | Performed by: PHYSICIAN ASSISTANT

## 2025-05-13 NOTE — PROGRESS NOTES
The patient location is:  Home  The chief complaint leading to consultation is:  3 month postop evaluation    Visit type: audiovisual    Face to Face time with patient: 8 min  15 minutes of total time spent on the encounter, which includes face to face time and non-face to face time preparing to see the patient (eg, review of tests), Obtaining and/or reviewing separately obtained history, Documenting clinical information in the electronic or other health record, Independently interpreting results (not separately reported) and communicating results to the patient/family/caregiver, or Care coordination (not separately reported).         Each patient to whom he or she provides medical services by telemedicine is:  (1) informed of the relationship between the physician and patient and the respective role of any other health care provider with respect to management of the patient; and (2) notified that he or she may decline to receive medical services by telemedicine and may withdraw from such care at any time.    Notes:   [facility]  Neurosurgery    SUBJECTIVE:     Interval history 5/13/25:  Patient returns via virtual visit to review EMG results. She continues to wear her wrist brace nightly and finds it to be very helpful. She also wears it during the day if she is performing repetitive tasks. Pain is limited to her right wrist. Numbness in her fingers can occur during the night or day depending on her activities.       HPI 02/20/2025   Patient returns via virtual visit for routine 3 month postop evaluation, status post C6-7 ACDF with Dr. Pandey.  Preop radicular pain remains resolved.  Postop scapular pain is now resolved.  No complaints of neck pain.  Her only complaint is of right hand numbness that is most predominant in the 1st, 2nd, 3rd, and inside of the 4th digits.  She believes this is a return of carpal tunnel.  The wrist will wake her up at night due to pain unless she wears a wrist brace for  support.    (Not in a hospital admission)      Review of patient's allergies indicates:  No Known Allergies    Past Medical History:   Diagnosis Date    Anxiety     Arthritis     Depression     GERD (gastroesophageal reflux disease)     History of pneumonia 05/2024    Obesity, unspecified     PONV (postoperative nausea and vomiting)     PTSD (post-traumatic stress disorder)      Past Surgical History:   Procedure Laterality Date    ANTERIOR CERVICAL DISCECTOMY W/ FUSION N/A 11/18/2024    Procedure: ACDF C6-C7;  Surgeon: Ben Pandey MD;  Location: [facility];  Service: Neurosurgery;  Laterality: N/A;  no monitoring. depuy vendor. horseshoe headrest, halter traction, regular bed, fluoro, microscope  Depuy rep notified- [place]---CLEARED BY PCP  RN PREOP 11/4/2024   UPT, T/S DAY OF SURG----NEED CONSENT    CARPAL TUNNEL RELEASE Right 2001    CARPAL TUNNEL RELEASE Left 2023    CERVICAL BIOPSY  W/ LOOP ELECTRODE EXCISION N/A     CHOLECYSTECTOMY N/A 2003    e sure implant Left 2012    REMOVAL OF INTRAUTERINE DEVICE (IUD) N/A 2019    UTERINE FIBROID EMBOLIZATION N/A     2008     Social History     Tobacco Use    Smoking status: Former     Types: Cigarettes    Smokeless tobacco: Never    Tobacco comments:     Quit tobacco October 20, 2023   Substance Use Topics    Alcohol use: Yes     Alcohol/week: 4.0 standard drinks of alcohol     Types: 4 Shots of liquor per week    Drug use: Not Currently        Review of Systems:  As noted in HPI    OBJECTIVE:     Vital Signs (Most Recent):  There were no vitals filed for this visit.  (virtual visit)      Physical Exam:  General: well developed, well nourished, no distress.  Right anterior cervical incision appears completely healed.  Head: normocephalic, atraumatic  Neurologic: Alert and oriented. Thought content appropriate  GCS: Motor: 6/Verbal: 5/Eyes: 4 GCS Total: 15  Language: No aphasia  Speech: No dysarthria  Cranial nerves: face symmetric, tongue midline, CN II-XII grossly  intact.   Eyes: pupils equal, round, reactive to light with accommodation, EOMI.   Pulmonary: normal respirations, not labored, no accessory muscles used    Motor Strength: Moves all extremities spontaneously with good tone.    Gait: normal      Anterior cervical incision is well healed  Right wrist brace       Diagnostic Results:  I have personally reviewed imaging and agree with the findings.     Cervical x-ray AP lateral 02/18/2025   Stable hardware placement    December 2024 cervical x-rays show excellent hardware placement at C6 and C7.  There is improvement in lordosis at the C6-7 segment.    ASSESSMENT/PLAN:     Anna Suarez is a 54 y.o. female status post C6-7 ACDF in November 2024 who returns for EMG results. She reported worsening numbness in her right hand that she attributed to carpal tunnel syndrome.  She did have carpal tunnel surgery on this hand in 2001, and she reports these symptoms are very similar.  She is wearing a wrist brace at night intermittently in notices a significant improvement in pain on the night when she wears it. EMG reveals severe chronic carpal tunnel in the right wrist. She is not interested in injections due to a prior bad and very painful experience in the past. She would like to proceed with surgery later this summer.     Case to be discussed with Dr. Pandey regarding timing of repeat carpal tunnel surgery vs referral to orthopedics.     Please call with any questions or concerns prior to your next appointment.         Araseli Benjamin PA-C  [facility]  Department of Neurosurgery  [phone]

## 2025-05-13 NOTE — Clinical Note
Good morning,  I am referring this patient for recurrent right sided carpal tunnel syndrome with h/o CTR in 2001. EMG completed 5/7/25. She is interested in repeat surgery later this summer.   Thank you,  Araseli

## 2025-06-12 DIAGNOSIS — M79.642 BILATERAL HAND PAIN: ICD-10-CM

## 2025-06-12 DIAGNOSIS — Z87.01 HISTORY OF PNEUMONIA: ICD-10-CM

## 2025-06-12 DIAGNOSIS — M79.641 BILATERAL HAND PAIN: ICD-10-CM

## 2025-06-12 DIAGNOSIS — R11.2 PONV (POSTOPERATIVE NAUSEA AND VOMITING): ICD-10-CM

## 2025-06-12 DIAGNOSIS — Z98.890 PONV (POSTOPERATIVE NAUSEA AND VOMITING): ICD-10-CM

## 2025-06-17 ENCOUNTER — TELEPHONE (OUTPATIENT)
Dept: ORTHOPEDICS | Facility: CLINIC | Age: 55
End: 2025-06-17
Payer: COMMERCIAL

## 2025-06-17 NOTE — TELEPHONE ENCOUNTER
Patient communication:    Notified patient to stop at [facility] Location - 1st Floor [address], Please park in [place] and use [place] elevators 30 minutes prior to your appointment time for X-ray. After your X-ray please proceed to 9th Floor [address] for Appointment on 6/18/25 with Dr. Ayala for x-rays.     Made them aware that this is not a scheduled xray appointment and they might be running behind as they are considered a walk-in xray.    Verbalized the Following:  *Please arrive at your informed time above, if you are more than 15 Minutes late to your appointment with Dr. Ayala we will have to reschedule your appointment. This will allow you to be seen in a timely manor and be conscious to other patients being seen that same day*

## 2025-06-17 NOTE — PROGRESS NOTES
"  [facility]  History & Physical  Orthopedics    SUBJECTIVE:      Chief Complaint:   Chief Complaint   Patient presents with    Right Hand - Numbness, Pain     8/10 at worst       Referring Provider: Araseli Benjamin, *     Patient is a 54 y.o. right hand dominant female who presents today with complaints of right hand pain.  History of Present Illness    HPI:  Patient presents for evaluation of right hand carpal tunnel syndrome. She reports a history of carpal tunnel syndrome in both hands. Her left hand was surgically treated in January 2023. Right hand symptoms began as mild in summer 2022, progressing to severe by the time of this visit. She underwent endoscopic right carpal tunnel release surgery in April 2001.  She also had neck surgery in November 2023 to address shooting pains.    She has numbness and pain in her right hand, primarily affecting the typical carpal tunnel distribution fingers. She reports that the numbness and pain can become severe enough to extend beyond the typical affected area. She describes the sensation as an "electric shock" when the affected area is tapped. Symptoms are severe enough to disturb sleep, which affects her overall health.    To manage symptoms, she wears a soft brace at night to keep the wrist from bending, which helps alleviate symptoms. She denies dropping objects but notes weakness in the affected hand. She works in [employer], which involves frequent computer use. To reduce repetitive stress on the right hand, she has trained herself to use the computer mouse with her left hand.    A recent nerve study confirmed severe carpal tunnel syndrome in the right hand. Her activities are limited due to the condition, particularly affecting sleep and work performance. She recently relocated to [place] in October 2023.    She denies any issues with the left hand.    WORK STATUS:  - Patient works in [employer]  - Job involves " frequent computer use, including typing and using a mouse  - Patient has trained herself to use the mouse with her left hand to reduce repetitive stress on the right hand  - Patient's sleep has been affected by hand issues, which in turn impacts overall health and ability to function at work         Vitals:    06/18/25 0815   PainSc:   8   PainLoc: Wrist     The patient denies any fevers, chills, N/V, D/C and presents for evaluation.    Past Medical History:   Diagnosis Date    Anxiety     Arthritis     Depression     GERD (gastroesophageal reflux disease)     History of pneumonia 05/2024    Obesity, unspecified     PONV (postoperative nausea and vomiting)     PTSD (post-traumatic stress disorder)      Past Surgical History:   Procedure Laterality Date    ANTERIOR CERVICAL DISCECTOMY W/ FUSION N/A 11/18/2024    Procedure: ACDF C6-C7;  Surgeon: Ben Pandey MD;  Location: [facility];  Service: Neurosurgery;  Laterality: N/A;  no monitoring. depuy vendor. horseshoe headrest, halter traction, regular bed, fluoro, microscope  Depuy rep notified- NC---CLEARED BY PCP  RN PREOP 11/4/2024   UPT, T/S DAY OF SURG----NEED CONSENT    CARPAL TUNNEL RELEASE Right 2001    CARPAL TUNNEL RELEASE Left 2023    CERVICAL BIOPSY  W/ LOOP ELECTRODE EXCISION N/A     CHOLECYSTECTOMY N/A 2003    e sure implant Left 2012    REMOVAL OF INTRAUTERINE DEVICE (IUD) N/A 2019    UTERINE FIBROID EMBOLIZATION N/A     2008     Review of patient's allergies indicates:  No Known Allergies  Social History     Social History Narrative     in 2008     Family History   Problem Relation Name Age of Onset    Diabetes Mellitus Mother      Breast cancer Mother      Hypertension Father      Alzheimer's disease Father      Diabetes type II Sister         Current Medications[1]    ROS    Review of Systems:  Constitutional: no fever or chills  Eyes: no visual changes  ENT: no nasal congestion or sore throat  Respiratory: no cough or shortness of  "breath  Cardiovascular: no chest pain  Gastrointestinal: no nausea or vomiting, tolerating diet  Musculoskeletal: pain, soreness, and numbness/tingling    OBJECTIVE:      Vital Signs (Most Recent):  Vitals:    06/18/25 0815   Weight: 88.9 kg (195 lb 15.8 oz)   Height: 5' 1" (1.549 m)     Body mass index is 37.03 kg/m².    Physical Exam    Physical Exam:  Constitutional: The patient appears well-developed and well-nourished. No distress.   Head: Normocephalic and atraumatic.   Nose: Nose normal.   Eyes: Conjunctivae and EOM are normal.   Neck: No tracheal deviation present.   Cardiovascular: Normal rate and intact distal pulses.    Pulmonary/Chest: Effort normal. No respiratory distress.   Abdominal: There is no guarding.   Lymphatic: Negative for adenopathy   Neurological: The patient is alert.   Psychiatric: The patient has a normal mood and affect.     Bilateral Hand/Wrist Examination:  Observation/Inspection:  Swelling  none    Deformity  none  Erythema  none     Scars   none    Atrophy  none  Synovitis  none    HAND/WRIST EXAMINATION:      Positive compression, Tinel's and Phalen's right wrist, decreased sensibility in the median distribution, otherwise bilateral ROM fingers/hand/wrist/elbow full    Neurovascular Exam:  Digits WWP, brisk CR < 3s throughout  NVI motor/LTS to M/R/U nerves, radial pulse 2+  2+ biceps and brachioradialis reflexes    Diagnostic studies and other clinical records review:  X-rays AP, lateral and oblique bilateral hands taken today are independently reviewed by me and shows 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 Carpal tunnel syndrome).  There is no motor axonal loss.  There is no active denervation.  This is graded as Severe in severity on the right.             ___________________________  Jhoan Mccarty D.O.    ASSESSMENT/PLAN:    54 y.o. " yo female with   Encounter Diagnoses   Name Primary?    Right carpal tunnel syndrome     History of bilateral carpal tunnel release     Chronic hand pain, right Yes     Plan: The patient and I had a thorough discussion today. We discussed the working diagnosis as well as several other potential alternative diagnoses. Treatment options were discussed, both conservative and surgical. Conservative treatment options would include things such as activity modifications, workplace modifications, a period of rest, oral vs topical OTC and prescription anti-inflammatory medications, occupational therapy, splinting/bracing, immobilization, corticosteroid injections, and others. Surgical options were discussed as well.     At this time, the patient has exhausted conservative measures and would like to proceed with surgery. Surgery would include right revision carpal tunnel release, right hypothenar fat pad transfer, application scar barrier right wrist, wrist neurolysis right. Consent signed today in clinic. Light use of the hand will be indicated for the first 4-6 weeks     We have discussed risks, benefits and alternatives of hand surgery which include but are not limited to blood clots in the legs that can travel to the lungs (pulmonary embolism). Pulmonary embolism can cause shortness of breath, chest pain, and even shock. Other risks include urinary tract infection, nausea and vomiting (usually related to pain medication), chronic pain, bleeding, nerve damage, blood vessel injury, scarring and infection of the hand which can require re-operation. Furthermore, the risks of anesthesia include potential heart, lung, kidney, and liver damage.  Informed consent was obtained. she understands and would like to proceed with surgery in the near future.    The patient's pathophysiology was explained in detail with reference to x-rays, models, other visual aids as appropriate.  Treatment options were discussed in detail.  Questions  were invited and answered to the patient's satisfaction. I reviewed Primary care , and other specialty's notes to better coordinate patient's care.          Cherry Ayala MD    Please be aware that this note has been generated with the assistance of MModal voice-to-text.  Please excuse any spelling or grammatical errors.  This note was generated with the assistance of ambient listening technology. Verbal consent was obtained by the patient and accompanying visitor(s) for the recording of patient appointment to facilitate this note. I attest to having reviewed and edited the generated note for accuracy, though some syntax or spelling errors may persist. Please contact the author of this note for any clarification.           [1]   Current Outpatient Medications:     acetaminophen (TYLENOL) 325 MG tablet, Take 325 mg by mouth. Four tablets at nighttime as needed for pain, Disp: , Rfl:     cloNIDine (CATAPRES) 0.1 MG tablet, Take 0.2 mg by mouth every evening., Disp: , Rfl:     dextromethorphan-bupropion (AUVELITY)  mg TbIE, Take 1 tablet by mouth 2 (two) times a day., Disp: , Rfl:     gabapentin (NEURONTIN) 300 MG capsule, Take 900 mg by mouth every evening. As needed, Disp: , Rfl:     lamoTRIgine (LAMICTAL) 25 MG tablet, Take 25 mg by mouth once daily. dose nightly, Disp: , Rfl:     pantoprazole sodium (PANTOPRAZOLE ORAL), Take by mouth. As needed for reflux, Disp: , Rfl:     psyllium husk (METAMUCIL ORAL), Take by mouth. As needed for constipation, Disp: , Rfl:     traZODone (DESYREL) 100 MG tablet, Take 100 mg by mouth every evening., Disp: , Rfl:

## 2025-06-18 ENCOUNTER — OFFICE VISIT (OUTPATIENT)
Dept: ORTHOPEDICS | Facility: CLINIC | Age: 55
End: 2025-06-18
Payer: COMMERCIAL

## 2025-06-18 ENCOUNTER — HOSPITAL ENCOUNTER (OUTPATIENT)
Dept: RADIOLOGY | Facility: OTHER | Age: 55
Discharge: HOME | End: 2025-06-18
Attending: ORTHOPAEDIC SURGERY
Payer: COMMERCIAL

## 2025-06-18 VITALS — BODY MASS INDEX: 37 KG/M2 | WEIGHT: 196 LBS | HEIGHT: 61 IN

## 2025-06-18 DIAGNOSIS — M79.642 BILATERAL HAND PAIN: ICD-10-CM

## 2025-06-18 DIAGNOSIS — M79.641 BILATERAL HAND PAIN: ICD-10-CM

## 2025-06-18 DIAGNOSIS — M79.641 CHRONIC HAND PAIN, RIGHT: Primary | ICD-10-CM

## 2025-06-18 DIAGNOSIS — Z98.890 HISTORY OF BILATERAL CARPAL TUNNEL RELEASE: ICD-10-CM

## 2025-06-18 DIAGNOSIS — G56.01 RIGHT CARPAL TUNNEL SYNDROME: ICD-10-CM

## 2025-06-18 DIAGNOSIS — G89.29 CHRONIC HAND PAIN, RIGHT: Primary | ICD-10-CM

## 2025-06-18 PROCEDURE — 99205 OFFICE O/P NEW HI 60 MIN: CPT | Mod: S$GLB,,, | Performed by: ORTHOPAEDIC SURGERY

## 2025-06-18 PROCEDURE — 1159F MED LIST DOCD IN RCRD: CPT | Mod: CPTII,S$GLB,, | Performed by: ORTHOPAEDIC SURGERY

## 2025-06-18 PROCEDURE — 99999 PR PBB SHADOW E&M-EST. PATIENT-LVL V: CPT | Mod: PBBFAC,,, | Performed by: ORTHOPAEDIC SURGERY

## 2025-06-18 PROCEDURE — 73130 X-RAY EXAM OF HAND: CPT | Mod: TC,50,FY

## 2025-06-18 PROCEDURE — 1160F RVW MEDS BY RX/DR IN RCRD: CPT | Mod: CPTII,S$GLB,, | Performed by: ORTHOPAEDIC SURGERY

## 2025-06-18 PROCEDURE — 3008F BODY MASS INDEX DOCD: CPT | Mod: CPTII,S$GLB,, | Performed by: ORTHOPAEDIC SURGERY

## 2025-06-18 PROCEDURE — 73130 X-RAY EXAM OF HAND: CPT | Mod: 26,,, | Performed by: RADIOLOGY

## 2025-06-18 RX ORDER — LAMOTRIGINE 25 MG/1
25 TABLET ORAL DAILY
COMMUNITY

## 2025-06-18 NOTE — PATIENT INSTRUCTIONS
[facility]  HAND CLINIC SURGERY INSTRUCTIONS  Cherry Ayala MD  Date of Surgery: 8/21/25    Location of Surgery:      [facility] [place] [address]    PRE-OPERATIVE INSTRUCTIONS:    Dr. Ayala's clinic staff will call you THE DAY BEFORE SURGERY with your arrival time. You will be notified in the afternoon between 11:00AM - 5:00pm. We will attempt to provide your arrival time earlier and will call you if this is at all possible.     DO NOT eat or drink after midnight, this includes gum/hard candy, coffee.   You may drink gatorade and water only up to 2 hours prior to arrival, NOTHING ELSE.    You ARE NOT to drive or take an Uber/Lyft/taxi home from surgery. Please arrange a friend/family member to accompany you at the surgery center and drive you home.  Transportation: TBD    PLEASE REMOVE nail polish and/or artificial nails prior to your surgery date if applicable. N/A    [facility] PAC[facility] Visitor Policy:    Each patient will be allowed 2 visitors with 1 visitor at a time. Only 1 swap out allowed.   Pediatric patients: Both parents are allowed if present.   Post-Op: If there is more than 1 visitor, the person that is the main caregiver will need to be available for d/c instructions should visit 2nd, and stay with the patient until d/c.  All visitors must be accompanied in and out with an employee.       PRE-OPERATIVE MEDICATION INSTRUCTIONS:    *The pre-operative center will send you a message via [facility] with your medication instructions the week prior to your surgery unless otherwise discussed*    If you are diabetic, DO NOT take any diabetic medication the night before surgery or morning of surgery. If you are type I diabetic on an insulin pump, please bring your monitor the morning of surgery.   N/A    MUST HOLD SEMAGLUTIDE MEDICATIONS 7 DAYS PRIOR TO SURGERY, examples: Mounjaro, Ozempic, Trulicity.   N/A    If you have high blood pressure please take  your medication in the morning like normal with a SIP OF WATER; with the exception of any Angiotensin receptor blocker (end in sartan) and ACE inhibitors (end in pril).    If you are taking blood thinners (Aspirin, Eliquis, Lovenox, Coumadin, etc), our office will contact the prescribing physician for guidance on when you are to stop/re-start your blood thinner medication.     Please HOLD Vitamins 5 days prior to surgery or sooner, CONTINUE Vitamin D up until the AM of your surgery.    Avoid anti-inflammatory medications 5 days before your surgery. This includes Aleve/Naproxen, Celebrex, Meloxicam/Mobic, Voltaren/Diclofenac.   You may dose ibuprofen/Advil/Motrin up until the day before your surgery.  You may take extra strength Tylenol/Acetaminophen.    HOLD ALL OTHER MEDICATIONS AM OF SURGERY THAT ARE NOT DISCUSSED ABOVE    Surgical Clearance:   Yes, surgical clearance is required prior to surgery. A letter has been sent to your provider and printed out for you today for you to give to your provider. Please have them fax surgical clearance to FAX: [phone]    POST-OPERATIVE MEDICATION INSTRUCTIONS:    Your post-operative pain medication will be DELIVERED BEDSIDE to you at the surgery center by the [facility]. You DO NOT need to pick this medication up from the [facility].     TAKE post-operative pain medication as directed.   You may also take over-the-counter extra strength Tylenol/acetaminophen as directed on the bottle for breakthrough pain between dosed of prescribed pain medication.   Please note, some pain medications contain Tylenol/acetaminophen.   DO NOT exceed 3000mg of Tylenol/acetaminophen in 1 day.  You may also use an over-the-counter anti-inflammatory medication also known as an NSAID,  (Aleve/Naproxen) as directed on the bottle for breakthrough pain between doses of prescribed pain medication.  DO NOT take NSAIDs if you have been previously instructed not to by a physician.      POST-OPERATIVE INSTRUCTIONS:    DO NOT let your operative area become wet, Your dressings/bandages/splints must remain dry.   Recommend waterproof arm cast cover to be worn when showering and bathing and can be purchased on Amazon. Garbage bags, plastic shopping bags, or umbrella bags can also be used instead.    DO NOT remove any post-operative dressings/bandages/splints until you are seen by Kisha Vyas PA-C, Dr. Ayala or Occupational Therapy   These dressings/bandages/splints are in place to keep the operative site clean, dry, and in optimal healing position     ELEVATE your hand/arm above the level of your heart as much as possible to decrease post-operative swelling for first 48 hours.  Swelling after surgery is TO BE EXPECTED.      ICE the operative site following surgery for 20-30 minutes, 4-5 times per day.  Be sure to have a towel between your dressings/bandages/splint to keep from getting wet.    MOVE the parts of your hand/arm that are not immobilized in a sling or splint.   Make a gentle fist with your fingers, bend/straighten your elbow, etc.   DO NOT attempt any strengthening exercises (hand putty, exercise balls, etc) on your operative side as this can increase swelling.     *CALL the [facility] at [phone]*  If at any time following surgery your dressings/bandages/splints: get wet, come loose, feels tight, feels uncomfortable.  Or if you are concerned about possible infection, worsening pain, worsening swelling or have any other concerns regarding your surgery.   Signs of infection:  fever (over 100.3), chills, body aches, increased warmth, redness, significant increase in swelling & pain at surgical site.     OUTPATIENT FOLLOW UP:  YOU WILL BE SEEN FIRST BY OCCUPATIONAL THERAPY 5-7 DAYS AFTER YOUR SURGERY. *Your splint / soft dressing will be removed as well as your sutures if applicable.  YOU WILL THEN BE SEEN BY KISHA VYAS PA-C IN CLINIC 2 weeks AFTER SURGERY AND   MANUELITO IN CLINIC 6 WEEKS AFTER SURGERY    Patient IQ (Survey)  A quick questionnaire (2-3 minutes) will be sent to you via text message or email in the next few hours. It will only take a few minutes to complete!  We care about your health, answering the questionnaire allows us to track your progress through your recovery to provide the best outcome for your recovery.    FMLA or Disability paperwork can be sent to [facility]  If you feel you will need accommodations at work for more than 2 weeks please ask your HR department for paperwork so that they can accommodate your restrictions. * Only needed if you are going to be out of work 2 weeks or more*\  Please send the paperwork filled out with your information to: Fax: [phone] or Email [email or web address]   This process should take 8-10 business days. If you have any concerns or need to check the status of your request please contact HIM at the email address or phone number listed below as once it leaves this department we do not have access to the status of your request. Phone: [phone]

## 2025-06-30 ENCOUNTER — TELEPHONE (OUTPATIENT)
Dept: ORTHOPEDICS | Facility: CLINIC | Age: 55
End: 2025-06-30
Payer: COMMERCIAL

## 2025-06-30 ENCOUNTER — PATIENT MESSAGE (OUTPATIENT)
Dept: ORTHOPEDICS | Facility: CLINIC | Age: 55
End: 2025-06-30
Payer: COMMERCIAL

## 2025-06-30 NOTE — TELEPHONE ENCOUNTER
Called patient and went over pre op instructions for surgery 8/21/25.     Scheduled. right revision carpal tunnel release, right hypothenar fat pad transfer, application scar barrier right wrist, wrist neurolysis right     Sent message to pre op center scheduling per Dr. Ayala for pre op clearance.    Farrukh Catherine MS, OTC   Sports Medicine Assistant   [facility]  (P) [phone]  (F) [phone]      Copied from CRM #[id number]. Topic: General Inquiry - Patient Advice  >> Jun 30, 2025 10:48 AM Melony wrote:  Type: Patient Call Back    Who called:Pt     What is the request in detail:Requesting status of upcoming procedure     Can the clinic reply by [facility]? Yes     Would the patient rather a call back or [facility] response [facility]?     Best call back number:Telephone Information:  Mobile          [phone]        Additional Information:

## 2025-07-09 ENCOUNTER — TELEPHONE (OUTPATIENT)
Dept: ORTHOPEDICS | Facility: CLINIC | Age: 55
End: 2025-07-09
Payer: COMMERCIAL

## 2025-07-09 ENCOUNTER — PATIENT MESSAGE (OUTPATIENT)
Dept: ORTHOPEDICS | Facility: CLINIC | Age: 55
End: 2025-07-09
Payer: COMMERCIAL

## 2025-07-09 NOTE — TELEPHONE ENCOUNTER
Changed appt time    Copied from CRM #[id number]. Topic: General Inquiry - Patient Advice  >> Jul 9, 2025 10:37 AM Melony wrote:  Type: Patient Call Back    Who called:Pt     What is the request in detail:Requesting reschedule PO    Can the clinic reply by [facility]? YES     Would the patient rather a call back or a response via [facility]? Both     Best call back number:Telephone Information:  Mobile          [phone]        Additional Information:

## 2025-07-09 NOTE — TELEPHONE ENCOUNTER
Spoke to patient. communicated appointment postop time changes.    No tasks were available in Patient iQ.    Farrukh Catherine MS, OTC   Sports Medicine Assistant   [facility]  (P) [phone]  (F) [phone]

## 2025-07-31 ENCOUNTER — NURSE TRIAGE (OUTPATIENT)
Dept: ADMINISTRATIVE | Facility: CLINIC | Age: 55
End: 2025-07-31
Payer: COMMERCIAL

## 2025-07-31 NOTE — TELEPHONE ENCOUNTER
No PCP    Patient states she has a history of Varicose Veins on the inside of her lower left leg. Patient states in mid-June she had a Pedicure and the veins were massaged with deep force. Patient states the area where her left leg was massaged and her left ankle  became edematous, red and tender to touch after the massage. Patient states she began wearing compression stockings for management of the swelling. Patient states since the massage she also had a couple of trips via airplane and she wore her compression hose during travel. Patient states that since her return home she has noticed the return of the swelling and pink areas on her left leg that are increasing in size. Patient states concern that she may have a blood clot due to her history of a Blood Clot in 2016 that was treated with Xarelto.     Patient states c/o Left Leg Swelling, tenderness to touch and increasing pink area of her varicose veins. Patient denies difficulty breathing and chest pain at this time.     CARE ADVICE given per Leg Swelling and Edema (Adult) guideline. Patient advised to Go to ED Now for evaluation. Patient also advised to contact the [facility] for any worsening symptoms. Patient states understanding of care advice.     Reason for Disposition   [1] Red area or streak [2] large (> 2 inches or 5 cm)    Additional Information   Negative: SEVERE difficulty breathing (e.g., struggling for each breath, speaks in single words)   Negative: Looks like a broken bone or dislocated joint (e.g., crooked or deformed)   Negative: Sounds like a life-threatening emergency to the triager   Negative: Chest Pain   Negative: Followed a leg injury   Negative: [1] Followed an insect bite AND [2] localized swelling (e.g., small area of puffy or swollen skin)   Negative: Swelling of one ankle joint   Negative: Swelling of knee is main symptom   Negative: Pregnant   Negative: Postpartum (from 0 to 6 weeks after delivery)   Negative: [1]  Heart failure suspected (e.g., ankle swelling, shortness of breath, weight gain) AND [2] recently in hospital for heart failure   Negative: [1] Heart failure suspected (e.g., ankle swelling, shortness of breath, weight gain) AND [2] being treated for heart failure   Negative: Difficulty breathing at rest   Negative: Entire foot is cool or blue in comparison to other side   Negative: [1] Can't walk or can barely walk AND [2] new-onset   Negative: [1] Difficulty breathing with exertion (e.g., walking) and [2] NEW or getting WORSE   Negative: [1] Red area or streak AND [2] fever   Negative: [1] Swelling is painful to touch AND [2] fever   Negative: [1] Cast on leg or ankle AND [2] now increased pain   Negative: Patient sounds very sick or weak to the triager   Negative: SEVERE leg swelling (e.g., swelling extends above knee, entire leg is swollen, weeping fluid)    Protocols used: Leg Swelling and Edema-A-AH

## 2025-08-01 ENCOUNTER — PATIENT MESSAGE (OUTPATIENT)
Dept: ORTHOPEDICS | Facility: CLINIC | Age: 55
End: 2025-08-01
Payer: COMMERCIAL

## 2025-08-01 ENCOUNTER — HOSPITAL ENCOUNTER (EMERGENCY)
Facility: HOSPITAL | Age: 55
Discharge: HOME | End: 2025-08-01
Attending: STUDENT IN AN ORGANIZED HEALTH CARE EDUCATION/TRAINING PROGRAM
Payer: COMMERCIAL

## 2025-08-01 ENCOUNTER — TELEPHONE (OUTPATIENT)
Dept: VASCULAR SURGERY | Facility: CLINIC | Age: 55
End: 2025-08-01
Payer: COMMERCIAL

## 2025-08-01 VITALS
BODY MASS INDEX: 36.98 KG/M2 | WEIGHT: 195.88 LBS | DIASTOLIC BLOOD PRESSURE: 85 MMHG | HEART RATE: 68 BPM | RESPIRATION RATE: 16 BRPM | HEIGHT: 61 IN | SYSTOLIC BLOOD PRESSURE: 155 MMHG | OXYGEN SATURATION: 96 % | TEMPERATURE: 98 F

## 2025-08-01 DIAGNOSIS — I82.442 ACUTE DEEP VEIN THROMBOSIS (DVT) OF TIBIAL VEIN OF LEFT LOWER EXTREMITY: ICD-10-CM

## 2025-08-01 DIAGNOSIS — M79.606 LEG PAIN: ICD-10-CM

## 2025-08-01 DIAGNOSIS — I82.412 ACUTE DEEP VEIN THROMBOSIS (DVT) OF FEMORAL VEIN OF LEFT LOWER EXTREMITY: Primary | ICD-10-CM

## 2025-08-01 DIAGNOSIS — I82.432 ACUTE DEEP VEIN THROMBOSIS (DVT) OF POPLITEAL VEIN OF LEFT LOWER EXTREMITY: ICD-10-CM

## 2025-08-01 LAB
ABSOLUTE EOSINOPHIL (OHS): 0 K/UL
ABSOLUTE MONOCYTE (OHS): 0.53 K/UL (ref 0.3–1)
ABSOLUTE NEUTROPHIL COUNT (OHS): 4.62 K/UL (ref 1.8–7.7)
ALBUMIN SERPL BCP-MCNC: 3.7 G/DL (ref 3.5–5.2)
ALP SERPL-CCNC: 55 UNIT/L (ref 40–150)
ALT SERPL W/O P-5'-P-CCNC: 16 UNIT/L (ref 0–55)
ANION GAP (OHS): 7 MMOL/L (ref 8–16)
APTT PPP: 24.7 SECONDS (ref 21–32)
AST SERPL-CCNC: 24 UNIT/L (ref 0–50)
BASOPHILS # BLD AUTO: 0.03 K/UL
BASOPHILS NFR BLD AUTO: 0.4 %
BILIRUB SERPL-MCNC: 0.3 MG/DL (ref 0.1–1)
BUN SERPL-MCNC: 16 MG/DL (ref 6–20)
CALCIUM SERPL-MCNC: 8.8 MG/DL (ref 8.7–10.5)
CHLORIDE SERPL-SCNC: 107 MMOL/L (ref 95–110)
CO2 SERPL-SCNC: 26 MMOL/L (ref 23–29)
CREAT SERPL-MCNC: 0.8 MG/DL (ref 0.5–1.4)
CRP SERPL-MCNC: 17.1 MG/L
ERYTHROCYTE [DISTWIDTH] IN BLOOD BY AUTOMATED COUNT: 13.7 % (ref 11.5–14.5)
ERYTHROCYTE [SEDIMENTATION RATE] IN BLOOD BY PHOTOMETRIC METHOD: 32 MM/HR
GFR SERPLBLD CREATININE-BSD FMLA CKD-EPI: >60 ML/MIN/1.73/M2
GLUCOSE SERPL-MCNC: 88 MG/DL (ref 70–110)
HCT VFR BLD AUTO: 36.7 % (ref 37–48.5)
HCV AB SERPL QL IA: NORMAL
HGB BLD-MCNC: 12.1 GM/DL (ref 12–16)
HIV 1+2 AB+HIV1 P24 AG SERPL QL IA: NORMAL
IMM GRANULOCYTES # BLD AUTO: 0.02 K/UL (ref 0–0.04)
IMM GRANULOCYTES NFR BLD AUTO: 0.3 % (ref 0–0.5)
INR PPP: 1 (ref 0.8–1.2)
LYMPHOCYTES # BLD AUTO: 1.65 K/UL (ref 1–4.8)
MCH RBC QN AUTO: 29.9 PG (ref 27–31)
MCHC RBC AUTO-ENTMCNC: 33 G/DL (ref 32–36)
MCV RBC AUTO: 91 FL (ref 82–98)
NUCLEATED RBC (/100WBC) (OHS): 0 /100 WBC
PLATELET # BLD AUTO: 248 K/UL (ref 150–450)
PMV BLD AUTO: 9.9 FL (ref 9.2–12.9)
POTASSIUM SERPL-SCNC: 4 MMOL/L (ref 3.5–5.1)
PROT SERPL-MCNC: 7.4 GM/DL (ref 6–8.4)
PROTHROMBIN TIME: 10.6 SECONDS (ref 9–12.5)
RBC # BLD AUTO: 4.05 M/UL (ref 4–5.4)
RELATIVE EOSINOPHIL (OHS): 0 %
RELATIVE LYMPHOCYTE (OHS): 24.1 % (ref 18–48)
RELATIVE MONOCYTE (OHS): 7.7 % (ref 4–15)
RELATIVE NEUTROPHIL (OHS): 67.5 % (ref 38–73)
SODIUM SERPL-SCNC: 140 MMOL/L (ref 136–145)
WBC # BLD AUTO: 6.85 K/UL (ref 3.9–12.7)

## 2025-08-01 PROCEDURE — 86140 C-REACTIVE PROTEIN: CPT | Performed by: PHYSICIAN ASSISTANT

## 2025-08-01 PROCEDURE — 87389 HIV-1 AG W/HIV-1&-2 AB AG IA: CPT | Performed by: PHYSICIAN ASSISTANT

## 2025-08-01 PROCEDURE — 86803 HEPATITIS C AB TEST: CPT | Performed by: PHYSICIAN ASSISTANT

## 2025-08-01 PROCEDURE — 82435 ASSAY OF BLOOD CHLORIDE: CPT | Performed by: PHYSICIAN ASSISTANT

## 2025-08-01 PROCEDURE — 85025 COMPLETE CBC W/AUTO DIFF WBC: CPT | Performed by: PHYSICIAN ASSISTANT

## 2025-08-01 PROCEDURE — 25000003 PHARM REV CODE 250: Performed by: PHYSICIAN ASSISTANT

## 2025-08-01 PROCEDURE — 99284 EMERGENCY DEPT VISIT MOD MDM: CPT | Mod: 25

## 2025-08-01 PROCEDURE — 85730 THROMBOPLASTIN TIME PARTIAL: CPT | Performed by: PHYSICIAN ASSISTANT

## 2025-08-01 PROCEDURE — 85610 PROTHROMBIN TIME: CPT | Performed by: PHYSICIAN ASSISTANT

## 2025-08-01 PROCEDURE — 85652 RBC SED RATE AUTOMATED: CPT | Performed by: PHYSICIAN ASSISTANT

## 2025-08-01 RX ORDER — RIVAROXABAN 15 MG-20MG
KIT ORAL
Qty: 1 EACH | Refills: 0 | Status: SHIPPED | OUTPATIENT
Start: 2025-08-01

## 2025-08-01 RX ORDER — HYDROCODONE BITARTRATE AND ACETAMINOPHEN 5; 325 MG/1; MG/1
1 TABLET ORAL
Refills: 0 | Status: COMPLETED | OUTPATIENT
Start: 2025-08-01 | End: 2025-08-01

## 2025-08-01 RX ADMIN — HYDROCODONE BITARTRATE AND ACETAMINOPHEN 1 TABLET: 5; 325 TABLET ORAL at 11:08

## 2025-08-01 RX ADMIN — RIVAROXABAN 15 MG: 15 TABLET, FILM COATED ORAL at 12:08

## 2025-08-01 NOTE — TELEPHONE ENCOUNTER
Spoke with the pt and informed her per Dr Galdamez that pt needs to see vascular med.Patient verbalized understanding and had no further questions.   ----- Message from Gurvinder Galdamez MD sent at 8/1/2025  2:10 PM CDT -----  Regarding: RE: referral  Agreed.  Vascular Medicine.  Thanks.  ----- Message -----  From: Kianna Hodges LPN  Sent: 8/1/2025   1:53 PM CDT  To: Gurvinder Galdamez MD  Subject: referral                                         Can you please review and confirm if pt needs to see vascular medicine?  Thanks,  Kianna

## 2025-08-04 LAB — HOLD SPECIMEN: NORMAL

## 2025-08-05 ENCOUNTER — HOSPITAL ENCOUNTER (OUTPATIENT)
Dept: RADIOLOGY | Facility: HOSPITAL | Age: 55
Discharge: HOME | End: 2025-08-05
Attending: PHYSICIAN ASSISTANT
Payer: COMMERCIAL

## 2025-08-05 ENCOUNTER — OFFICE VISIT (OUTPATIENT)
Dept: NEUROSURGERY | Facility: CLINIC | Age: 55
End: 2025-08-05
Attending: STUDENT IN AN ORGANIZED HEALTH CARE EDUCATION/TRAINING PROGRAM
Payer: COMMERCIAL

## 2025-08-05 VITALS
DIASTOLIC BLOOD PRESSURE: 73 MMHG | SYSTOLIC BLOOD PRESSURE: 141 MMHG | HEIGHT: 61 IN | OXYGEN SATURATION: 95 % | HEART RATE: 71 BPM | BODY MASS INDEX: 38.51 KG/M2 | WEIGHT: 203.94 LBS

## 2025-08-05 DIAGNOSIS — Z98.1 ARTHRODESIS STATUS: ICD-10-CM

## 2025-08-05 DIAGNOSIS — Z98.1 S/P CERVICAL SPINAL FUSION: Primary | ICD-10-CM

## 2025-08-05 PROCEDURE — 3077F SYST BP >= 140 MM HG: CPT | Mod: CPTII,S$GLB,, | Performed by: STUDENT IN AN ORGANIZED HEALTH CARE EDUCATION/TRAINING PROGRAM

## 2025-08-05 PROCEDURE — 3008F BODY MASS INDEX DOCD: CPT | Mod: CPTII,S$GLB,, | Performed by: STUDENT IN AN ORGANIZED HEALTH CARE EDUCATION/TRAINING PROGRAM

## 2025-08-05 PROCEDURE — 99213 OFFICE O/P EST LOW 20 MIN: CPT | Mod: S$GLB,,, | Performed by: STUDENT IN AN ORGANIZED HEALTH CARE EDUCATION/TRAINING PROGRAM

## 2025-08-05 PROCEDURE — 3078F DIAST BP <80 MM HG: CPT | Mod: CPTII,S$GLB,, | Performed by: STUDENT IN AN ORGANIZED HEALTH CARE EDUCATION/TRAINING PROGRAM

## 2025-08-05 PROCEDURE — 1159F MED LIST DOCD IN RCRD: CPT | Mod: CPTII,S$GLB,, | Performed by: STUDENT IN AN ORGANIZED HEALTH CARE EDUCATION/TRAINING PROGRAM

## 2025-08-05 PROCEDURE — 72125 CT NECK SPINE W/O DYE: CPT | Mod: TC

## 2025-08-05 PROCEDURE — 72125 CT NECK SPINE W/O DYE: CPT | Mod: 26,,, | Performed by: RADIOLOGY

## 2025-08-05 NOTE — PROGRESS NOTES
[facility]  Neurosurgery    SUBJECTIVE:     Interval history 08/05/2025:  Patient returns now 9 months status post C6-7 ACDF.  She is doing very well in terms of her neck symptoms.  She is not having any radicular pain coming down the right arm.  She does continue to have carpal tunnel type pain distal to the wrist on the right side.  She was scheduled to have surgery for this later this month, however it was found that she has left leg DVTs and is now on Xarelto.  She does look forward to getting her recurrent carpal tunnel treated after her DVTs are treated.    Interval history 5/13/25:  Patient returns via virtual visit to review EMG results. She continues to wear her wrist brace nightly and finds it to be very helpful. She also wears it during the day if she is performing repetitive tasks. Pain is limited to her right wrist. Numbness in her fingers can occur during the night or day depending on her activities.    HPI 02/20/2025   Patient returns via virtual visit for routine 3 month postop evaluation, status post C6-7 ACDF with Dr. Pandey.  Preop radicular pain remains resolved.  Postop scapular pain is now resolved.  No complaints of neck pain.  Her only complaint is of right hand numbness that is most predominant in the 1st, 2nd, 3rd, and inside of the 4th digits.  She believes this is a return of carpal tunnel.  The wrist will wake her up at night due to pain unless she wears a wrist brace for support.    Interval history 12/18/2024.    Patient has had complete resolution of her radiculopathy.  She does have interscapular pain as expected.  She states she still has some numbness on the distal finger pads of the right 1st and 3rd digits as well as on the inside aspect of her 4th digit.  She has a history of carpal tunnel syndrome status post carpal tunnel release.  She is doing well in terms of swallowing, walking.  She is wearing her neck brace quite frequently because she does have some worries  related to the possibility of messing up her hardware.  She inquired about possible physical therapy.  She will take a pain pill every once in a while, just at night, but has not required any refills.    Postop interval history 12/3/24:  Anna Suarez is a 54 y.o. female who presents to clinic today for 2 week wound check, s/p C6-7 ACDF with Dr. Pandey.  Denies fevers, chills, night sweats or N/V. Further denies wound drainage or swelling. Pt has been taking Norco in the as night as needed for pain.  Overall she seems very happy with the surgery.  All radicular arm pain is fully resolved.  She has some residual numbness in the distal 1st through 4th fingers on the right hand.  Denies fever.  Having regular bowel movements.  Denies swallowing difficulties.      Shortly after surgery, she did have redness and swelling around her incision that was suspicious for an allergic reaction to the adhesive dressing.  Medrol Dosepak was provided and symptoms quickly improved.    History of Present Illness:  Anna Suarez is a 54 y.o. female with chronic cervical radiculopathy who presents with cervical radiculopathy. Symptoms began at least two years ago and has responded well to interventional pain mgmt procedures in the past, though with decreasing efficacy. She denies neck pain. Pain starts in her right arm and travels down to her 1st 3 fingers, but occasionally includes all 5 fingers. She notes numbness in her right hand as well. Pain can be at any time of the day or night, but frequently disrupts sleep. She can stand up at night and let her right arm hang limp to get some relief.     Denies gait disturbance and fine motor dysfunction.     Interval History 8/29/24: patient returns after imaging to discuss findings and consider surgery. She continues to have the same symptoms but now has noticed that she gets an occasional pain in her palmar aspect of right hand. Her symptoms are mostly numbness of fingers 1-3 on her right  hand. A few times in the day it worsens and feels like burning nerve pain to her. This tends to be worse at night and interrupts her sleep. She is on a combination of medicines to help her sleep at night. She is able to work but it bothers her at work quite a bit. Her sleep is her biggest problem due to the pain which is affecting all areas of her life. She has tried multiple injections and they are becoming less effective. PT did not help her. Of note she has had carpal tunnel surgeries on both sides, the one on the right done back in 2001. She reports she had an EMG back in 2022 in [place] that supposedly said she has some return of carpal tunnel on the right.     Treatments tried:  -AVERY: reports 4 prior procedures, three of which sound like cervical AVERY. Most recent is described as an injection with a lateral approach (facet injection, MBB?)  -Gabapentin: 300mg TID  -Muscle relaxer: flexeril 10  -Rx pain medications: tramadol and mobic    -Spine surgery: never    Blood thinners: none    (Not in a hospital admission)      Review of patient's allergies indicates:  No Known Allergies    Past Medical History:   Diagnosis Date    Acute superficial venous thrombosis of left lower extremity     Anxiety     Arthritis     Depression     GERD (gastroesophageal reflux disease)     History of pneumonia 05/2024    Obesity, unspecified     PONV (postoperative nausea and vomiting)     PTSD (post-traumatic stress disorder)      Past Surgical History:   Procedure Laterality Date    ANTERIOR CERVICAL DISCECTOMY W/ FUSION N/A 11/18/2024    Procedure: ACDF C6-C7;  Surgeon: Ben Pandey MD;  Location: [facility];  Service: Neurosurgery;  Laterality: N/A;  no monitoring. depuy vendor. horseshoe headrest, halter traction, regular bed, fluoro, microscope  Depuy rep notified- NC---CLEARED BY PCP  RN PREOP 11/4/2024   UPT, T/S DAY OF SURG----NEED CONSENT    CARPAL TUNNEL RELEASE Right 2001    CARPAL TUNNEL RELEASE Left 2023    CERVICAL  "BIOPSY  W/ LOOP ELECTRODE EXCISION N/A     CHOLECYSTECTOMY N/A 2003    e sure implant Left 2012    REMOVAL OF INTRAUTERINE DEVICE (IUD) N/A 2019    UTERINE FIBROID EMBOLIZATION N/A     2008     Family History   Problem Relation Name Age of Onset    Diabetes Mellitus Mother      Breast cancer Mother      Hypertension Father      Alzheimer's disease Father      Diabetes type II Sister       Social History     Tobacco Use    Smoking status: Some Days     Types: Cigarettes    Smokeless tobacco: Never    Tobacco comments:     Quit tobacco October 20, 2023   Substance Use Topics    Alcohol use: Yes     Alcohol/week: 4.0 standard drinks of alcohol     Types: 4 Shots of liquor per week     Comment: Occasionally    Drug use: Not Currently        Review of Systems:  As noted in HPI    OBJECTIVE:     Vital Signs (Most Recent):  Vitals:    08/05/25 0716   BP: (!) 141/73   Pulse: 71   SpO2: 95%   Weight: 92.5 kg (203 lb 14.8 oz)   Height: 5' 1" (1.549 m)   PainSc:   3   PainLoc: Neck         Physical Exam:  General: well developed, well nourished, no distress.  Right anterior cervical incision appears completely healed.  Head: normocephalic, atraumatic  Neurologic: Alert and oriented. Thought content appropriate  GCS: Motor: 6/Verbal: 5/Eyes: 4 GCS Total: 15  Language: No aphasia  Speech: No dysarthria  Cranial nerves: face symmetric, tongue midline, CN II-XII grossly intact.   Eyes: pupils equal, round, reactive to light with accommodation, EOMI.   Pulmonary: normal respirations, not labored, no accessory muscles used    Motor Strength: Moves all extremities spontaneously with good tone.  Full strength upper and lower extremities. No abnormal movements seen.     Strength  Deltoids Triceps Biceps Wrist Extension Wrist Flexion Hand  FA   Upper: R 5/5 5/5 5/5 5/5 5/5 5/5 5/5    L 5/5 5/5 5/5 5/5 5/5 5/5 5/5     Gait: normal   Anterior neck incision appears totally healed     Diagnostic Results:  I have personally reviewed " imaging and agree with the findings.     We obtain CT C-spine today.  This shows complete fusion between C6 and C7.    ASSESSMENT/PLAN:     Anna Suarez is a 54 y.o. female 9-months status post C6-7 ACDF  -patient is doing well in terms of her neck surgery  -she appears completely fused on her CT  -I discussed with her the risk of adjacent level disease.  We discussed the importance of posture, core strengthening, and ergonomics.  It sounds like she is taking all of these things very seriously.  I am okay with her doing physical therapy, yoga, any kind of exercise to help with her neck.  -I have encouraged the patient to reach back out if she has any future symptoms, we will have her follow up on an as-needed basis at this time because she appears completely fused.    Ben Pandey  Neurosurgery

## 2025-08-07 ENCOUNTER — OFFICE VISIT (OUTPATIENT)
Dept: CARDIOLOGY | Facility: CLINIC | Age: 55
End: 2025-08-07
Payer: COMMERCIAL

## 2025-08-07 VITALS
SYSTOLIC BLOOD PRESSURE: 127 MMHG | BODY MASS INDEX: 37.04 KG/M2 | WEIGHT: 196.19 LBS | OXYGEN SATURATION: 97 % | HEIGHT: 61 IN | DIASTOLIC BLOOD PRESSURE: 72 MMHG | HEART RATE: 73 BPM

## 2025-08-07 DIAGNOSIS — E66.01 SEVERE OBESITY: ICD-10-CM

## 2025-08-07 DIAGNOSIS — I82.432 ACUTE DEEP VEIN THROMBOSIS (DVT) OF POPLITEAL VEIN OF LEFT LOWER EXTREMITY: ICD-10-CM

## 2025-08-07 DIAGNOSIS — I82.442 ACUTE DEEP VEIN THROMBOSIS (DVT) OF TIBIAL VEIN OF LEFT LOWER EXTREMITY: ICD-10-CM

## 2025-08-07 DIAGNOSIS — I82.412 ACUTE DEEP VEIN THROMBOSIS (DVT) OF FEMORAL VEIN OF LEFT LOWER EXTREMITY: Primary | ICD-10-CM

## 2025-08-07 PROCEDURE — 99999 PR PBB SHADOW E&M-EST. PATIENT-LVL V: CPT | Mod: PBBFAC,,, | Performed by: INTERNAL MEDICINE

## 2025-08-07 PROCEDURE — 1159F MED LIST DOCD IN RCRD: CPT | Mod: CPTII,S$GLB,, | Performed by: INTERNAL MEDICINE

## 2025-08-07 PROCEDURE — 3078F DIAST BP <80 MM HG: CPT | Mod: CPTII,S$GLB,, | Performed by: INTERNAL MEDICINE

## 2025-08-07 PROCEDURE — 3074F SYST BP LT 130 MM HG: CPT | Mod: CPTII,S$GLB,, | Performed by: INTERNAL MEDICINE

## 2025-08-07 PROCEDURE — 99205 OFFICE O/P NEW HI 60 MIN: CPT | Mod: S$GLB,,, | Performed by: INTERNAL MEDICINE

## 2025-08-07 PROCEDURE — 3008F BODY MASS INDEX DOCD: CPT | Mod: CPTII,S$GLB,, | Performed by: INTERNAL MEDICINE

## 2025-08-07 NOTE — PATIENT INSTRUCTIONS
Assessment/Plan:  Anna Suarez is a 54 y.o. female with MCTD, severe obesity, who presents for an initial appointment.     Extensive Acute deep vein thrombosis (DVT) of left lower extremity- Appears unprovoked. Of note, pt reports history of LLE superficial venous thrombosis treated initially with Lovenox and subsequently with Xarelto. Reports family history of blood clots in her mother. Refer to Heme/Onc for hypercoagulable workup and age appropriate cancer screening. Lifelong anticoagulation indicated. Check CT venogram to evaluate for May-Thurner Syndrome. Continue Xarelto 15 mg bid for 21 days, then 20 mg daily thereafter. Check repeat LLE venous ultrasound in 3 months.    2. Severe obesity- Encourage diet, exercise, and weight loss.     Follow up in 3 months with BLE venous ultrasound prior

## 2025-08-07 NOTE — PROGRESS NOTES
"[facility]      Chief Complaint   Patient presents with    Acute deep vein thrombosis       Patient ID: Anna Suarez is a 54 y.o. female with MCTD, severe obesity, who presents for an initial appointment. Pertinent history/events are as follows:     -Pt kindly referred by Supriya Ross PA-C for evaluation of acute DVT of femoral, popliteal, and tibial veins of left lower extremity (per her note on 8/1/2025):  "54 y.o. female with a past medical history of a superficial venous thrombosis requiring Lovenox and Xarelto presented to the ED with area of localized swelling and erythema to left calf. Differentials include, but are not limited to cellulitis, DVT, allergic reaction. Ultrasound with partial nonocclusive DVTs of the left femoral, popliteal, and posterior tibial veins. She was given a Norco for pain control but declined a prescription as she would prefer to take Tylenol at home. Referral was sent to vascular surgery. Prescription for Xarelto Dosepak sent to requested pharmacy. She was discharged in stable conditions with instructions to return to ED in the event of trauma while on DOACs or in the event of chest pain or shortness of breath.     HPI:  Mrs. Suarez reports LLE pain and swelling starting in mid June 2025 after getting a pedicure while on vacation. She reports 3 hour flights in June 2025. On 8/1/2025, pt was evaluated in the ED for LLE swelling and pain.  LLE Venous Ultrasound  on 8/1/2025 revealed partial nonocclusive thrombus involving the left femoral, popliteal, and posterior tibial veins. Pt was started on full dose anticoagulation with Xarelto at that time. She notes improvement in LLE swelling and pain since starting Xarelto. She has no chest pain or SOB.  She's tolerating Xarelto with no bleeding issues. Of note, pt reports history of LLE superficial venous thrombosis treated initially with Lovenox and subsequently with Xarelto. Reports family history of blood clots in her " mother.     Past Medical History:   Diagnosis Date    Acute superficial venous thrombosis of left lower extremity     Anxiety     Arthritis     Depression     GERD (gastroesophageal reflux disease)     History of pneumonia 05/2024    Obesity, unspecified     PONV (postoperative nausea and vomiting)     PTSD (post-traumatic stress disorder)      Past Surgical History:   Procedure Laterality Date    ANTERIOR CERVICAL DISCECTOMY W/ FUSION N/A 11/18/2024    Procedure: ACDF C6-C7;  Surgeon: Ben Pandey MD;  Location: [facility] OR;  Service: Neurosurgery;  Laterality: N/A;  no monitoring. depuy vendor. horseshoe headrest, halter traction, regular bed, fluoro, microscope  Depuy rep notified- [place]---CLEARED BY PCP  RN PREOP 11/4/2024   UPT, T/S DAY OF SURG----NEED CONSENT    CARPAL TUNNEL RELEASE Right 2001    CARPAL TUNNEL RELEASE Left 2023    CERVICAL BIOPSY  W/ LOOP ELECTRODE EXCISION N/A     CHOLECYSTECTOMY N/A 2003    e sure implant Left 2012    REMOVAL OF INTRAUTERINE DEVICE (IUD) N/A 2019    UTERINE FIBROID EMBOLIZATION N/A     2008     Social History[1]  Family History   Problem Relation Name Age of Onset    Diabetes Mellitus Mother      Breast cancer Mother      Hypertension Father      Alzheimer's disease Father      Diabetes type II Sister         Review of patient's allergies indicates:  No Known Allergies    Medication List with Changes/Refills   Current Medications    ACETAMINOPHEN (TYLENOL) 325 MG TABLET    Take 325 mg by mouth. Four tablets at nighttime as needed for pain    CLONIDINE (CATAPRES) 0.1 MG TABLET    Take 0.2 mg by mouth every evening.    DEXTROMETHORPHAN-BUPROPION (AUVELITY)  MG TBIE    Take 1 tablet by mouth 2 (two) times a day.    GABAPENTIN (NEURONTIN) 300 MG CAPSULE    Take 900 mg by mouth every evening. As needed    LAMOTRIGINE (LAMICTAL) 25 MG TABLET    Take 25 mg by mouth once daily. dose nightly    PANTOPRAZOLE SODIUM (PANTOPRAZOLE ORAL)    Take by mouth. As needed for reflux     "PSYLLIUM HUSK (METAMUCIL ORAL)    Take by mouth. As needed for constipation    RIVAROXABAN (XARELTO DVT-PE TREAT 30D START) 15 MG (42)- 20 MG (9) TABLET DOSE PACK    Take 1 tablet (15 mg) by mouth twice daily with food for 21 days followed by 1 tablet (20 mg) by mouth once daily with food    TRAZODONE (DESYREL) 100 MG TABLET    Take 100 mg by mouth every evening.       Review of Systems  Constitution: Denies chills, fever, and sweats.  HENT: Denies headaches or blurry vision.  Cardiovascular: Denies chest pain or irregular heart beat.  Respiratory: Denies cough or shortness of breath.  Gastrointestinal: Denies abdominal pain, nausea, or vomiting.  Musculoskeletal: Denies muscle cramps.  Neurological: Denies dizziness or focal weakness.  Psychiatric/Behavioral: Normal mental status.  Hematologic/Lymphatic: Denies bleeding problem or easy bruising/bleeding.  Skin: Denies rash or suspicious lesions    Physical Examination  /72 (BP Location: Left arm, Patient Position: Sitting)   Pulse 73   Ht 5' 1" (1.549 m)   Wt 89 kg (196 lb 3.4 oz)   LMP  (LMP Unknown)   SpO2 97%   BMI 37.07 kg/m²     Constitutional: No acute distress, conversant  HEENT: Sclera anicteric, Pupils equal, round and reactive to light, extraocular motions intact, Oropharynx clear  Neck: No JVD, no carotid bruits  Cardiovascular: regular rate and rhythm, no murmur, rubs or gallops, normal S1/S2  Pulmonary: Clear to auscultation bilaterally  Abdominal: Abdomen soft, nontender, nondistended, positive bowel sounds  Extremities: No lower extremity edema,   Pulses:  Carotid pulses are 2+ on the right side, and 2+ on the left side.  Radial pulses are 2+ on the right side, and 2+ on the left side.   Femoral pulses are 2+ on the right side, and 2+ on the left side.  Popliteal pulses are 2+ on the right side, and 2+ on the left side.   Dorsalis pedis pulses are 2+ on the right side, and 2+ on the left side.   Posterior tibial pulses are 2+ on the right " "side, and 2+ on the left side.    Skin: No ecchymosis, erythema, or ulcers  Psych: Alert and oriented x 3, appropriate affect  Neuro: CNII-XII intact, no focal deficits    Labs:  Most Recent Data  CBC:   Lab Results   Component Value Date    WBC 6.85 08/01/2025    HGB 12.1 08/01/2025    HCT 36.7 (L) 08/01/2025     08/01/2025    MCV 91 08/01/2025    RDW 13.7 08/01/2025     BMP:   Lab Results   Component Value Date     08/01/2025    K 4.0 08/01/2025     08/01/2025    CO2 26 08/01/2025    BUN 16 08/01/2025    CREATININE 0.8 08/01/2025    GLU 88 08/01/2025    CALCIUM 8.8 08/01/2025     LFTS;   Lab Results   Component Value Date    PROT 7.4 08/01/2025    ALBUMIN 3.7 08/01/2025    BILITOT 0.3 08/01/2025    AST 24 08/01/2025    ALKPHOS 55 08/01/2025    ALT 16 08/01/2025     COAGS:   Lab Results   Component Value Date    INR 1.0 08/01/2025    PROTIME 10.6 08/01/2025     FLP: No results found for: "CHOL", "HDL", "LDLCALC", "TRIG", "CHOLHDL"  CARDIAC: No results found for: "TROPONINI", "CKTOTAL", "CKMB", "BNP"    Imaging:    LLE Venous Ultrasound 8/1/2025:  Partial nonocclusive thrombus involving the left femoral, popliteal, and posterior tibial veins.     Assessment/Plan:  Anna Suarez is a 54 y.o. female with MCTD, severe obesity, who presents for an initial appointment.     Extensive Acute deep vein thrombosis (DVT) of left lower extremity- Appears unprovoked. Of note, pt reports history of LLE superficial venous thrombosis treated initially with Lovenox and subsequently with Xarelto. Reports family history of blood clots in her mother. Refer to Heme/Onc for hypercoagulable workup and age appropriate cancer screening. Lifelong anticoagulation indicated. Check CT venogram to evaluate for May-Thurner Syndrome. Continue Xarelto 15 mg bid for 21 days, then 20 mg daily thereafter. Check repeat LLE venous ultrasound in 3 months.    2. Severe obesity- Encourage diet, exercise, and weight loss.     Follow up in " 3 months with BLE venous ultrasound prior    Total duration of face to face visit time 30 minutes.  Total time spent counseling greater than fifty percent of total visit time.  Counseling included discussion regarding imaging findings, diagnosis, possibilities, treatment options, risks and benefits.  The patient had many questions regarding the options and long-term effects.    Ben Vásquez MD, PhD  Interventional Cardiology         [1]   Social History  Socioeconomic History    Marital status:    Tobacco Use    Smoking status: Some Days     Types: Cigarettes    Smokeless tobacco: Never    Tobacco comments:     Quit tobacco October 20, 2023   Substance and Sexual Activity    Alcohol use: Yes     Alcohol/week: 4.0 standard drinks of alcohol     Types: 4 Shots of liquor per week     Comment: Occasionally    Drug use: Not Currently    Sexual activity: Not Currently     Partners: Female, Male     Birth control/protection: I.U.D.   Social History Narrative     in 2008     Social Drivers of Health     Financial Resource Strain: Low Risk  (8/5/2025)    Received from [facility]    Overall Financial Resource Strain (CARDIA)     How hard is it for you to pay for the very basics like food, housing, medical care, and heating?: Not hard at all   Food Insecurity: No Food Insecurity (8/5/2025)    Received from [facility]    Hunger Vital Sign     Worried About Running Out of Food in the Last Year: Never true     Ran Out of Food in the Last Year: Never true   Transportation Needs: No Transportation Needs (8/5/2025)    Received from [facility]    PRAPARE - Transportation     In the past 12 months, has lack of transportation kept you from medical appointments or from getting medications?: No     In the past 12 months, has lack of transportation kept you from meetings, work, or from getting things needed for daily living?: No   Physical Activity: Insufficiently Active (8/5/2025)    Received from [facility]     Exercise Vital Sign     Days of Exercise per Week: 2 days     Minutes of Exercise per Session: 30 min   Stress: Stress Concern Present (8/5/2025)    Received from [facility] [place] [facility] - Occupational Stress Questionnaire     Do you feel stress - tense, restless, nervous, or anxious, or unable to sleep at night because your mind is troubled all the time - these days?: To some extent   Housing Stability: Unknown (8/5/2025)    Received from [facility]    Housing Stability Vital Sign     Unable to Pay for Housing in the Last Year: No

## 2025-08-14 ENCOUNTER — PATIENT MESSAGE (OUTPATIENT)
Dept: CARDIOLOGY | Facility: CLINIC | Age: 55
End: 2025-08-14
Payer: COMMERCIAL

## 2025-08-23 ENCOUNTER — HOSPITAL ENCOUNTER (OUTPATIENT)
Dept: RADIOLOGY | Facility: HOSPITAL | Age: 55
Discharge: HOME | End: 2025-08-23
Attending: INTERNAL MEDICINE
Payer: COMMERCIAL

## 2025-08-23 DIAGNOSIS — I82.412 ACUTE DEEP VEIN THROMBOSIS (DVT) OF FEMORAL VEIN OF LEFT LOWER EXTREMITY: ICD-10-CM

## 2025-08-23 PROCEDURE — 74177 CT ABD & PELVIS W/CONTRAST: CPT | Mod: TC

## 2025-08-23 PROCEDURE — 25500020 PHARM REV CODE 255: Performed by: INTERNAL MEDICINE

## 2025-08-23 RX ADMIN — IOHEXOL 100 ML: 350 INJECTION, SOLUTION INTRAVENOUS at 10:08

## 2025-08-28 ENCOUNTER — OFFICE VISIT (OUTPATIENT)
Dept: HEMATOLOGY/ONCOLOGY | Facility: CLINIC | Age: 55
End: 2025-08-28
Payer: COMMERCIAL

## 2025-08-28 DIAGNOSIS — Z12.11 COLON CANCER SCREENING: ICD-10-CM

## 2025-08-28 DIAGNOSIS — Z80.3 FAMILY HISTORY OF BREAST CANCER: ICD-10-CM

## 2025-08-28 DIAGNOSIS — Z97.5 IUD (INTRAUTERINE DEVICE) IN PLACE: ICD-10-CM

## 2025-08-28 DIAGNOSIS — I82.412 ACUTE DEEP VEIN THROMBOSIS (DVT) OF FEMORAL VEIN OF LEFT LOWER EXTREMITY: Primary | ICD-10-CM

## 2025-08-28 RX ORDER — TIRZEPATIDE 2.5 MG/.5ML
2.5 INJECTION, SOLUTION SUBCUTANEOUS
COMMUNITY
Start: 2025-08-06

## 2025-08-28 RX ORDER — ONDANSETRON 4 MG/1
4 TABLET, ORALLY DISINTEGRATING ORAL EVERY 6 HOURS PRN
COMMUNITY
Start: 2025-08-06 | End: 2025-09-20

## 2025-08-28 RX ORDER — CLONIDINE HYDROCHLORIDE 0.2 MG/1
TABLET ORAL
COMMUNITY

## 2025-08-28 RX ORDER — HYDROXYZINE HYDROCHLORIDE 10 MG/1
TABLET, FILM COATED ORAL
COMMUNITY

## 2025-09-03 ENCOUNTER — TELEPHONE (OUTPATIENT)
Dept: OBSTETRICS AND GYNECOLOGY | Facility: CLINIC | Age: 55
End: 2025-09-03
Payer: COMMERCIAL

## (undated) DEVICE — SUT MONOCRYL 4.0 PS2 CP496G

## (undated) DEVICE — DRAPE T THYROID STERILE

## (undated) DEVICE — CLOSURE SKIN STERI STRIP 1/2X4

## (undated) DEVICE — ADHESIVE MASTISOL VIAL 48/BX

## (undated) DEVICE — COVER MAYO STAND 23X54IN

## (undated) DEVICE — DRAPE TOP 53X102IN

## (undated) DEVICE — DRAPE STERI INSTRUMENT 1018

## (undated) DEVICE — CORD BIPOLAR 12 FOOT

## (undated) DEVICE — SUT VICRYL+ 2-0 SH 18IN

## (undated) DEVICE — DRAPE OPMI STERILE

## (undated) DEVICE — PACK ECLIPSE SET-UP W/O DRAPE

## (undated) DEVICE — SUT 3-0 12-18IN SILK

## (undated) DEVICE — ELECTRODE REM PLYHSV RETURN 9

## (undated) DEVICE — GAUZE SPONGE PEANUT STRL

## (undated) DEVICE — MARKER SKIN RULER STERILE

## (undated) DEVICE — Device

## (undated) DEVICE — NDL SPINAL 18GX3.5 SPINOCAN

## (undated) DEVICE — SOL NACL IRR 1000ML BTL

## (undated) DEVICE — BUR BONE CUT MICRO TPS 3X3.8MM

## (undated) DEVICE — SPONGE KITTNER 1/4X 5/8 L STRL

## (undated) DEVICE — GLOVE SIGNATURE ESSNTL LTX 7.5

## (undated) DEVICE — SNAP CAP 18 DOME COVERS

## (undated) DEVICE — GLOVE SENSICARE PI GRN 8

## (undated) DEVICE — KIT SURGIFLO HEMOSTATIC MATRIX

## (undated) DEVICE — TRAY NEURO OMC